# Patient Record
Sex: FEMALE | Race: WHITE | Employment: OTHER | URBAN - METROPOLITAN AREA
[De-identification: names, ages, dates, MRNs, and addresses within clinical notes are randomized per-mention and may not be internally consistent; named-entity substitution may affect disease eponyms.]

---

## 2017-11-08 ENCOUNTER — APPOINTMENT (OUTPATIENT)
Dept: GENERAL RADIOLOGY | Age: 77
DRG: 309 | End: 2017-11-08
Attending: EMERGENCY MEDICINE
Payer: MEDICARE

## 2017-11-08 ENCOUNTER — HOSPITAL ENCOUNTER (INPATIENT)
Age: 77
LOS: 3 days | Discharge: NURSING, REHAB OR LONG-TERM CARE | DRG: 309 | End: 2017-11-11
Attending: EMERGENCY MEDICINE | Admitting: INTERNAL MEDICINE
Payer: MEDICARE

## 2017-11-08 DIAGNOSIS — F03.90 DEMENTIA WITHOUT BEHAVIORAL DISTURBANCE, UNSPECIFIED DEMENTIA TYPE: ICD-10-CM

## 2017-11-08 DIAGNOSIS — I48.91 ATRIAL FIBRILLATION WITH RVR (HCC): Primary | ICD-10-CM

## 2017-11-08 PROBLEM — I10 HTN (HYPERTENSION): Status: ACTIVE | Noted: 2017-11-08

## 2017-11-08 PROBLEM — R62.7 FAILURE TO THRIVE IN ADULT: Status: ACTIVE | Noted: 2017-11-08

## 2017-11-08 LAB
ALBUMIN SERPL-MCNC: 3.8 G/DL (ref 3.4–5)
ALBUMIN/GLOB SERPL: 0.9 {RATIO} (ref 0.8–1.7)
ALP SERPL-CCNC: 141 U/L (ref 45–117)
ALT SERPL-CCNC: 26 U/L (ref 13–56)
ANION GAP SERPL CALC-SCNC: 12 MMOL/L (ref 3–18)
AST SERPL-CCNC: 24 U/L (ref 15–37)
BASOPHILS # BLD: 0.1 K/UL (ref 0–0.06)
BASOPHILS NFR BLD: 1 % (ref 0–2)
BILIRUB SERPL-MCNC: 0.4 MG/DL (ref 0.2–1)
BUN SERPL-MCNC: 12 MG/DL (ref 7–18)
BUN/CREAT SERPL: 11 (ref 12–20)
CALCIUM SERPL-MCNC: 9.5 MG/DL (ref 8.5–10.1)
CHLORIDE SERPL-SCNC: 107 MMOL/L (ref 100–108)
CK MB CFR SERPL CALC: 0.9 % (ref 0–4)
CK MB CFR SERPL CALC: 1 % (ref 0–4)
CK MB SERPL-MCNC: 1.5 NG/ML (ref 5–25)
CK MB SERPL-MCNC: 1.7 NG/ML (ref 5–25)
CK SERPL-CCNC: 158 U/L (ref 26–192)
CK SERPL-CCNC: 176 U/L (ref 26–192)
CO2 SERPL-SCNC: 26 MMOL/L (ref 21–32)
CREAT SERPL-MCNC: 1.13 MG/DL (ref 0.6–1.3)
DIFFERENTIAL METHOD BLD: ABNORMAL
EOSINOPHIL # BLD: 0.1 K/UL (ref 0–0.4)
EOSINOPHIL NFR BLD: 1 % (ref 0–5)
ERYTHROCYTE [DISTWIDTH] IN BLOOD BY AUTOMATED COUNT: 14.4 % (ref 11.6–14.5)
GLOBULIN SER CALC-MCNC: 4.1 G/DL (ref 2–4)
GLUCOSE SERPL-MCNC: 112 MG/DL (ref 74–99)
HCT VFR BLD AUTO: 39.4 % (ref 35–45)
HGB BLD-MCNC: 13.3 G/DL (ref 12–16)
INR PPP: 0.9 (ref 0.8–1.2)
LYMPHOCYTES # BLD: 1.6 K/UL (ref 0.9–3.6)
LYMPHOCYTES NFR BLD: 18 % (ref 21–52)
MCH RBC QN AUTO: 29.8 PG (ref 24–34)
MCHC RBC AUTO-ENTMCNC: 33.8 G/DL (ref 31–37)
MCV RBC AUTO: 88.1 FL (ref 74–97)
MONOCYTES # BLD: 0.7 K/UL (ref 0.05–1.2)
MONOCYTES NFR BLD: 8 % (ref 3–10)
NEUTS SEG # BLD: 6.4 K/UL (ref 1.8–8)
NEUTS SEG NFR BLD: 72 % (ref 40–73)
PLATELET # BLD AUTO: 251 K/UL (ref 135–420)
PMV BLD AUTO: 11.3 FL (ref 9.2–11.8)
POTASSIUM SERPL-SCNC: 3.4 MMOL/L (ref 3.5–5.5)
PROT SERPL-MCNC: 7.9 G/DL (ref 6.4–8.2)
PROTHROMBIN TIME: 11.6 SEC (ref 11.5–15.2)
RBC # BLD AUTO: 4.47 M/UL (ref 4.2–5.3)
SODIUM SERPL-SCNC: 145 MMOL/L (ref 136–145)
TROPONIN I SERPL-MCNC: 0.04 NG/ML (ref 0–0.06)
TROPONIN I SERPL-MCNC: 0.05 NG/ML (ref 0–0.06)
WBC # BLD AUTO: 8.9 K/UL (ref 4.6–13.2)

## 2017-11-08 PROCEDURE — 96365 THER/PROPH/DIAG IV INF INIT: CPT

## 2017-11-08 PROCEDURE — 82550 ASSAY OF CK (CPK): CPT | Performed by: EMERGENCY MEDICINE

## 2017-11-08 PROCEDURE — 65660000000 HC RM CCU STEPDOWN

## 2017-11-08 PROCEDURE — 93005 ELECTROCARDIOGRAM TRACING: CPT

## 2017-11-08 PROCEDURE — 85025 COMPLETE CBC W/AUTO DIFF WBC: CPT | Performed by: EMERGENCY MEDICINE

## 2017-11-08 PROCEDURE — 80053 COMPREHEN METABOLIC PANEL: CPT | Performed by: EMERGENCY MEDICINE

## 2017-11-08 PROCEDURE — 74011000250 HC RX REV CODE- 250: Performed by: EMERGENCY MEDICINE

## 2017-11-08 PROCEDURE — 85610 PROTHROMBIN TIME: CPT | Performed by: EMERGENCY MEDICINE

## 2017-11-08 PROCEDURE — 36415 COLL VENOUS BLD VENIPUNCTURE: CPT | Performed by: INTERNAL MEDICINE

## 2017-11-08 PROCEDURE — 74011250637 HC RX REV CODE- 250/637: Performed by: INTERNAL MEDICINE

## 2017-11-08 PROCEDURE — 99284 EMERGENCY DEPT VISIT MOD MDM: CPT

## 2017-11-08 PROCEDURE — 94760 N-INVAS EAR/PLS OXIMETRY 1: CPT

## 2017-11-08 PROCEDURE — 71010 XR CHEST PORT: CPT

## 2017-11-08 PROCEDURE — 74011250636 HC RX REV CODE- 250/636: Performed by: INTERNAL MEDICINE

## 2017-11-08 PROCEDURE — 74011000258 HC RX REV CODE- 258: Performed by: EMERGENCY MEDICINE

## 2017-11-08 RX ORDER — ENOXAPARIN SODIUM 100 MG/ML
60 INJECTION SUBCUTANEOUS ONCE
Status: COMPLETED | OUTPATIENT
Start: 2017-11-08 | End: 2017-11-08

## 2017-11-08 RX ORDER — POTASSIUM CHLORIDE 20 MEQ/1
40 TABLET, EXTENDED RELEASE ORAL 2 TIMES DAILY
Status: COMPLETED | OUTPATIENT
Start: 2017-11-08 | End: 2017-11-09

## 2017-11-08 RX ORDER — PANTOPRAZOLE SODIUM 40 MG/1
40 TABLET, DELAYED RELEASE ORAL DAILY
COMMUNITY

## 2017-11-08 RX ORDER — CITALOPRAM 10 MG/1
10 TABLET ORAL DAILY
Status: DISCONTINUED | OUTPATIENT
Start: 2017-11-09 | End: 2017-11-11

## 2017-11-08 RX ORDER — AMLODIPINE BESYLATE 5 MG/1
5 TABLET ORAL DAILY
Status: DISCONTINUED | OUTPATIENT
Start: 2017-11-09 | End: 2017-11-11 | Stop reason: HOSPADM

## 2017-11-08 RX ORDER — AMLODIPINE BESYLATE 5 MG/1
5 TABLET ORAL DAILY
COMMUNITY

## 2017-11-08 RX ORDER — HYDROCHLOROTHIAZIDE 12.5 MG/1
12.5 TABLET ORAL DAILY
COMMUNITY

## 2017-11-08 RX ORDER — DILTIAZEM HYDROCHLORIDE 5 MG/ML
15 INJECTION INTRAVENOUS
Status: COMPLETED | OUTPATIENT
Start: 2017-11-08 | End: 2017-11-08

## 2017-11-08 RX ORDER — ACETAMINOPHEN 325 MG/1
650 TABLET ORAL
Status: DISCONTINUED | OUTPATIENT
Start: 2017-11-08 | End: 2017-11-11 | Stop reason: HOSPADM

## 2017-11-08 RX ORDER — CITALOPRAM 10 MG/1
10 TABLET ORAL DAILY
COMMUNITY
End: 2017-11-11

## 2017-11-08 RX ORDER — PANTOPRAZOLE SODIUM 40 MG/1
40 TABLET, DELAYED RELEASE ORAL
Status: DISCONTINUED | OUTPATIENT
Start: 2017-11-08 | End: 2017-11-11 | Stop reason: HOSPADM

## 2017-11-08 RX ORDER — POTASSIUM CHLORIDE 750 MG/1
10 TABLET, FILM COATED, EXTENDED RELEASE ORAL DAILY
COMMUNITY

## 2017-11-08 RX ORDER — HYDROCHLOROTHIAZIDE 25 MG/1
12.5 TABLET ORAL DAILY
Status: DISCONTINUED | OUTPATIENT
Start: 2017-11-09 | End: 2017-11-11 | Stop reason: HOSPADM

## 2017-11-08 RX ADMIN — POTASSIUM CHLORIDE 40 MEQ: 20 TABLET, EXTENDED RELEASE ORAL at 21:14

## 2017-11-08 RX ADMIN — SODIUM CHLORIDE 2.5 MG/HR: 900 INJECTION, SOLUTION INTRAVENOUS at 16:50

## 2017-11-08 RX ADMIN — PANTOPRAZOLE SODIUM 40 MG: 40 TABLET, DELAYED RELEASE ORAL at 18:16

## 2017-11-08 RX ADMIN — DILTIAZEM HYDROCHLORIDE 15 MG: 5 INJECTION INTRAVENOUS at 16:49

## 2017-11-08 RX ADMIN — ENOXAPARIN SODIUM 60 MG: 60 INJECTION SUBCUTANEOUS at 18:30

## 2017-11-08 NOTE — ED PROVIDER NOTES
[address]  EMERGENCY DEPARTMENT HISTORY AND PHYSICAL EXAM       Date: 11/8/2017   Patient Name: Moises Beth   YOB: 1940  Medical Record Number: [id number]    History of Presenting Illness     Chief Complaint   Patient presents with    Johnie Angel     Saw Dr. Chary Harrison earlier today where he discoverd she had a-fib and was told to go to the ER        History Provided By:  Patient, daughter    Additional History: 4:20 PM  Moises Beth is a 68 y.o. female PMHx of dementia presents to the emergency department C/O palpitations onset this afternoon at physician's office. No associated symptoms. She was taken by her daughters to establish care with a primary care doctor. She has severe dementia and lives alone, her daughters are unsure as to whether or not she has been taking her medications. Patient is followed by Dr. Claudio Herring, Neurologist, for her dementia. Pt denies CP, SOB and any other Sx or complaints. HPI is limited to patient condition. Primary Care Provider: Paxton Del Real MD   Specialist:    Past History     Past Medical History:   Past Medical History:   Diagnosis Date    Menopause         Past Surgical History:   Past Surgical History:   Procedure Laterality Date    HX HYSTERECTOMY      Age 32        Family History:   Family History   Problem Relation Age of Onset    Breast Cancer Sister         Social History:   Social History   Substance Use Topics    Smoking status: Not on file    Smokeless tobacco: Not on file    Alcohol use Not on file        Allergies:   Not on File     Review of Systems   Review of Systems   Unable to perform ROS: Dementia   Respiratory: Negative for shortness of breath. Cardiovascular: Positive for palpitations. Negative for chest pain.        Physical Exam  Vitals:    11/08/17 1607   BP: (!) 163/98   Pulse: (!) 160   Resp: 23   Temp: 98 °F (36.7 °C)   SpO2: 97%   Weight: 59 kg (130 lb)   Height: 5' 6\" (1.676 m)       Physical Exam  Constitutional: Alert. Well appearing, no acute distress  Head: Normocephalic, Atraumatic  Eyes: Pupils are equal, round, and reactive to light, EOMI  ENT: Moist mucous membranes, oropharynx clear. Neck: Supple, non-tender  Cardiovascular: Tachycardia, Irregularly irregular rhythm. no murmurs, rubs, or gallops  Chest: Normal work of breathing and chest excursion bilaterally  Lungs: Clear to ausculation bilaterally  Abdomen: Soft, non tender, non distended, normoactive bowel sounds  Back: No evidence of trauma or deformity. No CVA Tenderness. Extremities: No evidence of trauma or deformity, no LE edema  Skin: Warm and dry  Neuro:  facial movement symmetric, normal speech, strength and sensation full and symmetric bilaterally, normal gait, normal coordination. Alert and oriented to person and place, not time or situation. Unable to answer many questions.    Psychiatric: Normal mood and affect    Diagnostic Study Results     Labs -      Recent Results (from the past 12 hour(s))   EKG, 12 LEAD, INITIAL    Collection Time: 11/08/17  3:53 PM   Result Value Ref Range    Ventricular Rate 142 BPM    Atrial Rate 142 BPM    QRS Duration 72 ms    Q-T Interval 286 ms    QTC Calculation (Bezet) 439 ms    Calculated R Axis -24 degrees    Calculated T Axis 77 degrees    Diagnosis       Undetermined rhythm  Marked ST abnormality, possible lateral subendocardial injury  Abnormal ECG  No previous ECGs available     CBC WITH AUTOMATED DIFF    Collection Time: 11/08/17  4:40 PM   Result Value Ref Range    WBC 8.9 4.6 - 13.2 K/uL    RBC 4.47 4.20 - 5.30 M/uL    HGB 13.3 12.0 - 16.0 g/dL    HCT 39.4 35.0 - 45.0 %    MCV 88.1 74.0 - 97.0 FL    MCH 29.8 24.0 - 34.0 PG    MCHC 33.8 31.0 - 37.0 g/dL    RDW 14.4 11.6 - 14.5 %    PLATELET 697 362 - 644 K/uL    MPV 11.3 9.2 - 11.8 FL    NEUTROPHILS 72 40 - 73 %    LYMPHOCYTES 18 (L) 21 - 52 %    MONOCYTES 8 3 - 10 %    EOSINOPHILS 1 0 - 5 %    BASOPHILS 1 0 - 2 % ABS. NEUTROPHILS 6.4 1.8 - 8.0 K/UL    ABS. LYMPHOCYTES 1.6 0.9 - 3.6 K/UL    ABS. MONOCYTES 0.7 0.05 - 1.2 K/UL    ABS. EOSINOPHILS 0.1 0.0 - 0.4 K/UL    ABS. BASOPHILS 0.1 (H) 0.0 - 0.06 K/UL    DF AUTOMATED     CARDIAC PANEL,(CK, CKMB & TROPONIN)    Collection Time: 11/08/17  4:40 PM   Result Value Ref Range     26 - 192 U/L    CK - MB 1.7 <3.6 ng/ml    CK-MB Index 1.0 0.0 - 4.0 %    Troponin-I, Qt. 0.05 0.00 - 0.90 NG/ML   METABOLIC PANEL, COMPREHENSIVE    Collection Time: 11/08/17  4:40 PM   Result Value Ref Range    Sodium 145 136 - 145 mmol/L    Potassium 3.4 (L) 3.5 - 5.5 mmol/L    Chloride 107 100 - 108 mmol/L    CO2 26 21 - 32 mmol/L    Anion gap 12 3.0 - 18 mmol/L    Glucose 112 (H) 74 - 99 mg/dL    BUN 12 7.0 - 18 MG/DL    Creatinine 1.13 0.6 - 1.3 MG/DL    BUN/Creatinine ratio 11 (L) 12 - 20      GFR est AA 57 (L) >60 ml/min/1.73m2    GFR est non-AA 47 (L) >60 ml/min/1.73m2    Calcium 9.5 8.5 - 10.1 MG/DL    Bilirubin, total 0.4 0.2 - 1.0 MG/DL    ALT (SGPT) 26 13 - 56 U/L    AST (SGOT) 24 15 - 37 U/L    Alk. phosphatase 141 (H) 45 - 117 U/L    Protein, total 7.9 6.4 - 8.2 g/dL    Albumin 3.8 3.4 - 5.0 g/dL    Globulin 4.1 (H) 2.0 - 4.0 g/dL    A-G Ratio 0.9 0.8 - 1.7     PROTHROMBIN TIME + INR    Collection Time: 11/08/17  4:40 PM   Result Value Ref Range    Prothrombin time 11.6 11.5 - 15.2 sec    INR 0.9 0.8 - 1.2         Radiologic Studies -  The following have been ordered and reviewed:  XR CHEST PORT   Final Result  Impression:  No acute radiographic cardiopulmonary abnormality. Stable exam.    As read by the radiologist.          Medical Decision Making   I am the first provider for this patient. I reviewed the vital signs, available nursing notes, past medical history, past surgical history, family history and social history. Vital Signs-Reviewed the patient's vital signs.    Patient Vitals for the past 12 hrs:   Temp Pulse Resp BP SpO2   11/08/17 1607 98 °F (36.7 °C) (!) 160 23 (!) 163/98 97 %       Pulse Oximetry Analysis - Normal 97% on RA     Cardiac Monitor:   Rate: 142 bpm  Rhythm: Atrial Fibrillation      EKG interpretation: (Preliminary)  Rhythm:Atrial Fibrillation . Rate (approx.): 142 bpm; T wave inversion in avL. No ST segment change. Normal QTC  interval    EKG read by Inga Ortiz MD at 3:58 PM    Old Medical Records: Old medical records. Nursing notes. Procedures:   Procedures    ED Course:  4:20 PM  Initial assessment performed. The patients presenting problems have been discussed, and they are in agreement with the care plan formulated and outlined with them. I have encouraged them to ask questions as they arise throughout their visit. 6:05 Discussed patient's history, exam, and available diagnostics results with Rigoberto Baker MD, hospitalist, who agree with telemetry. Medications Given in the ED:  Medications   dilTIAZem (CARDIZEM) 100 mg in 0.9% sodium chloride (MBP/ADV) 100 mL infusion (2.5 mg/hr IntraVENous New Bag 11/8/17 1650)   acetaminophen (TYLENOL) tablet 650 mg (not administered)   potassium chloride (K-DUR, KLOR-CON) SR tablet 40 mEq (not administered)   citalopram (CELEXA) tablet 10 mg (not administered)   hydroCHLOROthiazide (HYDRODIURIL) tablet 12.5 mg (not administered)   pantoprazole (PROTONIX) tablet 40 mg (40 mg Oral Given 11/8/17 1816)   amLODIPine (NORVASC) tablet 5 mg (not administered)   dilTIAZem (CARDIZEM) injection 15 mg (15 mg IntraVENous Given 11/8/17 1649)   enoxaparin (LOVENOX) injection 60 mg (60 mg SubCUTAneous Given 11/8/17 1830)       6:05 PM  Patient is being admitted to the hospital by Rigoberto Baker MD. The results of their tests and reasons for their admission have been discussed with them and/or available family. They convey agreement and understanding for the need to be admitted and for their admission diagnosis. CONDITIONS ON ADMISSION:  Deep Vein Thrombosis is not present at the time of admission.  Thrombosis is not present at the time of admission. Urinary Tract Infection is not present at the time of admission. Pneumonia is not present at the time of admission. MRSA is not present at the time of admission. Wound infection is not present at the time of admission. Pressure Ulcer is not present at the time of admission. Critical Care Time:   6:05 PM  I have spent 35  minutes of critical care time involved in lab review, consultations with specialist, family decision-making, and documentation. During this entire length of time I was immediately available to the patient. Critical Care: The reason for providing this level of medical care for this critically ill patient was due a critical illness that impaired one or more vital organ systems such that there was a high probability of imminent or life threatening deterioration in the patients condition. This care involved high complexity decision making to assess, manipulate, and support vital system functions, to treat this degreee vital organ system failure and to prevent further life threatening deterioration of the patients condition. Diagnosis   Clinical Impression:   1. Atrial fibrillation with RVR ([address]    2. Dementia without behavioral disturbance, unspecified dementia type         Discussion:  68year old female present for new onset Afib with RVR. Controlled with IV Diltiazem. Will require admission for continued cardiac management. Of note she has severe dementia and will likely need placement in a rehab or nursing facility.    _______________________________   Attestations: This note is prepared by Angela Fermin, acting as a Scribe for Tano Sanchez MD on 3:56 PM on 11/8/2017. Tano Sanchez MD: The scribe's documentation has been prepared under my direction and personally reviewed by me in its entirety.   _______________________________

## 2017-11-09 LAB
ANION GAP SERPL CALC-SCNC: 9 MMOL/L (ref 3–18)
BUN SERPL-MCNC: 12 MG/DL (ref 7–18)
BUN/CREAT SERPL: 10 (ref 12–20)
CALCIUM SERPL-MCNC: 8.4 MG/DL (ref 8.5–10.1)
CHLORIDE SERPL-SCNC: 109 MMOL/L (ref 100–108)
CHOLEST SERPL-MCNC: 118 MG/DL
CK MB CFR SERPL CALC: 1 % (ref 0–4)
CK MB SERPL-MCNC: 1.4 NG/ML (ref 5–25)
CK SERPL-CCNC: 141 U/L (ref 26–192)
CO2 SERPL-SCNC: 27 MMOL/L (ref 21–32)
CREAT SERPL-MCNC: 1.25 MG/DL (ref 0.6–1.3)
GLUCOSE SERPL-MCNC: 98 MG/DL (ref 74–99)
HDLC SERPL-MCNC: 53 MG/DL (ref 40–60)
HDLC SERPL: 2.2 {RATIO} (ref 0–5)
LDLC SERPL CALC-MCNC: 47 MG/DL (ref 0–100)
LIPID PROFILE,FLP: NORMAL
MAGNESIUM SERPL-MCNC: 2.4 MG/DL (ref 1.6–2.6)
POTASSIUM SERPL-SCNC: 3.9 MMOL/L (ref 3.5–5.5)
SODIUM SERPL-SCNC: 145 MMOL/L (ref 136–145)
TRIGL SERPL-MCNC: 90 MG/DL (ref ?–150)
TROPONIN I SERPL-MCNC: 0.04 NG/ML (ref 0–0.06)
TSH SERPL DL<=0.05 MIU/L-ACNC: 2.16 UIU/ML (ref 0.36–3.74)
VLDLC SERPL CALC-MCNC: 18 MG/DL

## 2017-11-09 PROCEDURE — 82550 ASSAY OF CK (CPK): CPT | Performed by: INTERNAL MEDICINE

## 2017-11-09 PROCEDURE — 74011250637 HC RX REV CODE- 250/637: Performed by: INTERNAL MEDICINE

## 2017-11-09 PROCEDURE — 65660000000 HC RM CCU STEPDOWN

## 2017-11-09 PROCEDURE — 83735 ASSAY OF MAGNESIUM: CPT | Performed by: INTERNAL MEDICINE

## 2017-11-09 PROCEDURE — 84443 ASSAY THYROID STIM HORMONE: CPT | Performed by: INTERNAL MEDICINE

## 2017-11-09 PROCEDURE — 74011250637 HC RX REV CODE- 250/637: Performed by: PHYSICIAN ASSISTANT

## 2017-11-09 PROCEDURE — 80061 LIPID PANEL: CPT | Performed by: INTERNAL MEDICINE

## 2017-11-09 PROCEDURE — 80048 BASIC METABOLIC PNL TOTAL CA: CPT | Performed by: INTERNAL MEDICINE

## 2017-11-09 PROCEDURE — 36415 COLL VENOUS BLD VENIPUNCTURE: CPT | Performed by: INTERNAL MEDICINE

## 2017-11-09 PROCEDURE — 97161 PT EVAL LOW COMPLEX 20 MIN: CPT

## 2017-11-09 PROCEDURE — 93306 TTE W/DOPPLER COMPLETE: CPT

## 2017-11-09 RX ORDER — SERTRALINE HYDROCHLORIDE 50 MG/1
25 TABLET, FILM COATED ORAL DAILY
Status: DISCONTINUED | OUTPATIENT
Start: 2017-11-09 | End: 2017-11-11

## 2017-11-09 RX ORDER — DILTIAZEM HYDROCHLORIDE 120 MG/1
120 CAPSULE, COATED, EXTENDED RELEASE ORAL DAILY
Status: DISCONTINUED | OUTPATIENT
Start: 2017-11-09 | End: 2017-11-11 | Stop reason: HOSPADM

## 2017-11-09 RX ORDER — OLANZAPINE 2.5 MG/1
2.5 TABLET ORAL
Status: DISCONTINUED | OUTPATIENT
Start: 2017-11-09 | End: 2017-11-11 | Stop reason: HOSPADM

## 2017-11-09 RX ORDER — ENOXAPARIN SODIUM 100 MG/ML
1 INJECTION SUBCUTANEOUS EVERY 12 HOURS
Status: DISCONTINUED | OUTPATIENT
Start: 2017-11-09 | End: 2017-11-09

## 2017-11-09 RX ORDER — OLANZAPINE 10 MG/1
5 TABLET ORAL EVERY EVENING
Status: DISCONTINUED | OUTPATIENT
Start: 2017-11-09 | End: 2017-11-11 | Stop reason: HOSPADM

## 2017-11-09 RX ORDER — DILTIAZEM HYDROCHLORIDE 120 MG/1
120 CAPSULE, COATED, EXTENDED RELEASE ORAL DAILY
Status: DISCONTINUED | OUTPATIENT
Start: 2017-11-10 | End: 2017-11-09

## 2017-11-09 RX ADMIN — POTASSIUM CHLORIDE 40 MEQ: 20 TABLET, EXTENDED RELEASE ORAL at 09:28

## 2017-11-09 RX ADMIN — OLANZAPINE 2.5 MG: 2.5 TABLET, FILM COATED ORAL at 09:27

## 2017-11-09 RX ADMIN — OLANZAPINE 5 MG: 10 TABLET, FILM COATED ORAL at 20:36

## 2017-11-09 RX ADMIN — PANTOPRAZOLE SODIUM 40 MG: 40 TABLET, DELAYED RELEASE ORAL at 17:44

## 2017-11-09 RX ADMIN — HYDROCHLOROTHIAZIDE 12.5 MG: 25 TABLET ORAL at 09:28

## 2017-11-09 RX ADMIN — SERTRALINE HYDROCHLORIDE 25 MG: 50 TABLET ORAL at 12:22

## 2017-11-09 RX ADMIN — AMLODIPINE BESYLATE 5 MG: 5 TABLET ORAL at 09:27

## 2017-11-09 RX ADMIN — DILTIAZEM HYDROCHLORIDE 120 MG: 120 CAPSULE, COATED, EXTENDED RELEASE ORAL at 12:22

## 2017-11-09 RX ADMIN — PANTOPRAZOLE SODIUM 40 MG: 40 TABLET, DELAYED RELEASE ORAL at 09:36

## 2017-11-09 NOTE — PROGRESS NOTES
Hospitalist Progress Note    Patient: Roxane Horton MRN: [id number]  CSN: [id number]    YOB: 1940  Age: 68 y.o. Sex: female    DOA: 11/8/2017 LOS:  LOS: 1 day          Chief Complaint:    Palpitations    Assessment/Plan     1. Afib with RVR  2. Failure to Thrive  3. Dementia, Moderate to Advanced  4. Hypertension  5. Hypokalemia  6. GERD    1. Patient converted to NSR while on Cardizem gtt. At this time, will discontinue gtt and start oral Cardizem at 120mg daily. She received one therapeutic dose of Lovenox while in the ER. At this time, will add aspirin 81mg also as the patient is in NSR.   2. PT/OT to see, evaluate, treat, and make recommendations. 4. Continue Norvasc, HCTZ for now, BP is stable. 5. K 3.9 this AM, will continue to monitor. 6. Protonix BID. Patient Active Problem List   Diagnosis Code    Atrial fibrillation with RVR (HCC) I48.91    HTN (hypertension) I10    Dementia F03.90    Failure to thrive in adult R62.7       Subjective:    Patient says she's doing well, no complaints or concerns. Daughter sitting at bedside.     Review of systems:    Constitutional: denies fevers, chills, myalgias  Respiratory: denies SOB, cough  Cardiovascular: denies chest pain, palpitations  Gastrointestinal: denies nausea, vomiting, diarrhea      Vital signs/Intake and Output:  Visit Vitals    /44 (BP 1 Location: Right arm, BP Patient Position: At rest)    Pulse 79    Temp 98.1 °F (36.7 °C)    Resp 16    Ht 5' 6\" (1.676 m)    Wt 67.1 kg (148 lb)    SpO2 97%    Breastfeeding No    BMI 23.89 kg/m2     Current Shift:  11/09 0701 - 11/09 1900  In: 397.2 [P.O.:360; I.V.:37.2]  Out: -   Last three shifts:  11/07 1901 - 11/09 0700  In: 240 [P.O.:240]  Out: -     Exam:    General: Well developed, alert, NAD  Head/Neck: NCAT, supple, No masses, No lymphadenopathy  CVS:Regular rate and rhythm, no M/R/G, S1/S2 heard, no thrill  Lungs:Clear to auscultation bilaterally, no wheezes, rhonchi, or rales  Abdomen: Soft, Nontender, No distention, Normal Bowel sounds, No hepatomegaly  Extremities: No C/C/E, pulses palpable 2+  Skin:normal texture and turgor, no rashes, no lesions  Neuro:grossly normal , follows commands  Psych:appropriate                Labs: Results:       Chemistry Recent Labs      11/09/17   0430  11/08/17   1640   GLU  98  112*   NA  145  145   K  3.9  3.4*   CL  109*  107   CO2  27  26   BUN  12  12   CREA  1.25  1.13   CA  8.4*  9.5   AGAP  9  12   BUCR  10*  11*   AP   --   141*   TP   --   7.9   ALB   --   3.8   GLOB   --   4.1*   AGRAT   --   0.9      CBC w/Diff Recent Labs      11/08/17   1640   WBC  8.9   RBC  4.47   HGB  13.3   HCT  39.4   PLT  251   GRANS  72   LYMPH  18*   EOS  1      Cardiac Enzymes Recent Labs      11/09/17   0430  11/08/17   2215   CPK  141  158   CKND1  1.0  0.9      Coagulation Recent Labs      11/08/17   1640   PTP  11.6   INR  0.9       Lipid Panel Lab Results   Component Value Date/Time    Cholesterol, total 118 11/09/2017 04:30 AM    HDL Cholesterol 53 11/09/2017 04:30 AM    LDL, calculated 47 11/09/2017 04:30 AM    VLDL, calculated 18 11/09/2017 04:30 AM    Triglyceride 90 11/09/2017 04:30 AM    CHOL/HDL Ratio 2.2 11/09/2017 04:30 AM      BNP No results for input(s): BNPP in the last 72 hours.    Liver Enzymes Recent Labs      11/08/17   1640   TP  7.9   ALB  3.8   AP  141*   SGOT  24      Thyroid Studies No results found for: T4, T3U, TSH, TSHEXT     Procedures/imaging: see electronic medical records for all procedures/Xrays and details which were not copied into this note but were reviewed prior to creation of [address], PA-C

## 2017-11-09 NOTE — ROUTINE PROCESS
TRANSFER - IN REPORT:    Verbal report received from LETI Benavides RN(name) on Tracy Aragon  being received from ED(unit) for routine progression of care      Report consisted of patients Situation, Background, Assessment and   Recommendations(SBAR). Information from the following report(s) SBAR, Kardex, ED Summary, Intake/Output, MAR and Recent Results was reviewed with the receiving nurse. Opportunity for questions and clarification was provided.

## 2017-11-09 NOTE — PROGRESS NOTES
1956:  Patient arrived to unit form ED via stretcher accompanied by ED medic and RN. Patient able to ambulate. From olson to bed without difficulty. IV Cardizem 2.5 mg/hr verified with ED RN at bedside. 2000:  Admission assessment completed. Patient is A&O x2, to person and place, but seems confused to situation and cannot give correct date. Per chart and family at bedside, patient with mild dementia. Patient repeats same questions multiple times, requires frequent reminders to stay on task. Lungs are CTA bilaterally on room air. No edema present. Telemetry box #1 shows Afib with a rate in the upper 90s to low 110s. BS + all 4 quads, patient stated she is hungry. Will given box lunch per diet orders. Patient denies pain and no needs are stated. 2114:  Scheduled medications given as ordered. POC for remainder of shift discussed with patient and family members and all questions answered. No further needs at this time. 2219:  Rounds completed. Patient sitting up in bed, doing word puzzles and watching TV. No needs stated. 2334:  Reassessment completed. Informed by Monitor Tech. That patient converted to NSR. Cardizem gtt. Continues at 2.5 mg/hr. HR currently 87. No other changes to previous assessment are noted. Patient denies pain and no needs are stated. 0100:  Rounds completed. No needs stated. Patient resting in bed with eyes closed. Remains in NSR, HR 80s.      0202:  Rounds completed. No needs noted. 1119:  Reassessment completed. No changes from previous assessment noted. Patient continues to deny pain and no needs are stated. 0505:  Rounds completed. No needs stated.

## 2017-11-09 NOTE — ROUTINE PROCESS
1500:  Assumed care of patient. Patient quietly resting in bed with family at bedside, patient denies an sob or pain. White board updated, call light left within reach.

## 2017-11-09 NOTE — PROGRESS NOTES
Problem: Falls - Risk of  Goal: *Absence of Falls  Document Mark Fall Risk and appropriate interventions in the flowsheet.    Outcome: Progressing Towards Goal  Fall Risk Interventions:  Mobility Interventions: Assess mobility with egress test, Patient to call before getting OOB    Mentation Interventions: Adequate sleep, hydration, pain control    Medication Interventions: Patient to call before getting OOB, Teach patient to arise slowly    Elimination Interventions: Call light in reach, Patient to call for help with toileting needs    History of Falls Interventions: Bed/chair exit alarm

## 2017-11-09 NOTE — PROGRESS NOTES
Problem: Falls - Risk of  Goal: *Absence of Falls  Document Mark Fall Risk and appropriate interventions in the flowsheet.    Outcome: Progressing Towards Goal  Fall Risk Interventions:  Mobility Interventions: Bed/chair exit alarm, Communicate number of staff needed for ambulation/transfer, Patient to call before getting OOB, Strengthening exercises (ROM-active/passive)    Mentation Interventions: Adequate sleep, hydration, pain control, Bed/chair exit alarm, Door open when patient unattended, Evaluate medications/consider consulting pharmacy, Eyeglasses and hearing aids, Family/sitter at bedside, Increase mobility, More frequent rounding, Reorient patient, Room close to nurse's station, Toileting rounds, Update white board    Medication Interventions: Bed/chair exit alarm, Evaluate medications/consider consulting pharmacy, Patient to call before getting OOB, Teach patient to arise slowly    Elimination Interventions: Bed/chair exit alarm, Call light in reach, Patient to call for help with toileting needs, Toileting schedule/hourly rounds    History of Falls Interventions: Bed/chair exit alarm, Door open when patient unattended, Evaluate medications/consider consulting pharmacy, Room close to nurse's station

## 2017-11-09 NOTE — PROGRESS NOTES

## 2017-11-09 NOTE — H&P
[address] [place] [address]  ROUTINE HISTORY AND PHYSICAL    Name:  Rosalinda Ramesh  MR#:  [id number]  :  1940  Account #:  [de-identified]  Date of Adm:  2017      PRIMARY CARE PROVIDER; Ronnie Steel MD    CHIEF COMPLAINT: Palpitations. HISTORY OF PRESENT ILLNESS: The patient is a 44-year-old  female with moderate to advanced dementia, she lives in her own  home. She has not followed regularly with primary care. She ordinarily  feels that there is nothing wrong with her and there is no reason to see  a primary care physician. She does see Dr. Sindi Coronado of  neurology at [facility] for her dementia. She was brought over by her  daughter for the palpitations. In the ER, she was evaluated. She had  an EKG and was found to have atrial fibrillation with RVR with a  ventricular rate of 142. Her cardiac enzymes were negative x1 set. Her  potassium was a tiny bit decreased at 3.4. CBC was okay. At home  she is on medication for high blood pressure, including amlodipine and  hydrochlorothiazide. She takes some potassium supplements. Given  this new diagnosis of atrial fibrillation with RVR, I have been asked to  admit her for further evaluation and treatment. At the present time, she  denies headache, focal neurological signs such as weakness or  paresthesias. She denies chest pain or palps. She denies shortness of  breath. She has no abdominal symptoms. She denies dysuria. PAST MEDICAL HISTORY: Hypertension, GERD, hypokalemia,  dementia. SOCIAL HISTORY: No alcohol, drugs, or tobacco.    FAMILY HISTORY: Hypertension. OUTPATIENT MEDICATIONS  1. Potassium. 2. Amlodipine. 3. Hydrochlorothiazide. 4. Protonix. 5. Celexa. MEDICATION ALLERGIES: NKDA. REVIEW OF SYSTEMS  GENERAL: No fever or chills. HEENT: No headache, no blurred vision. CARDIOVASCULAR: Positive for palpitations. No chest pain. RESPIRATORY: No cough or shortness of breath.   GASTROINTESTINAL: No nausea or vomiting. No blood in stool, no  melena. GENITOURINARY: No dysuria. MUSCULOSKELETAL: No myalgias or arthralgias. NEUROLOGIC: Denies focal neurological deficits. History positive for  dementia. PHYSICAL EXAMINATION  VITAL SIGNS: Blood pressure 163/98. Pulse presently 110, though the  computer lists 160. O2 saturation 97%, respiratory rate 23. GENERAL: No apparent distress, well-appearing. She appears to be  woman of few words. Not very talkative. HEENT: PERRLA. EOMI. Moist mucous membranes. NECK: Midline trachea. No bruits. HEART: S1, S2 irregularly irregular. Tachycardic. No murmur. LUNGS: Clear to auscultation bilaterally. ABDOMEN: Soft, nontender, nondistended. EXTREMITIES: No edema or cyanosis. LABORATORY DATA: White blood cell count 8.9, hemoglobin 13.3,  platelets 132. Sodium 145, potassium 3.4, BUN 12, creatinine 1. 13. Chest x-ray, no acute cardiothoracic abnormality. EKG as described  above. ASSESSMENT AND PLAN  1. Atrial fibrillation with rapid ventricular response. 2. Failure to thrive. 3. Dementia, moderate to advanced. 4. Hypertension. 5. Hypokalemia. 6. Gastroesophageal reflux. DISCUSSION AND PLAN  1. At this time, the patient will be brought into the hospital for further  evaluation and treatment. She has new fibrillation with RVR. It seems  like the ER is doing a good job getting her rate controlled. We will  continue her on the diltiazem drip and watch her overnight. I will give  her a shot of Lovenox to cover her and hopefully decrease her risk of  thromboembolism due to the atrial fibrillation. Ultimately, we will have  to figure out whether or not she is an anticoagulation candidate. Her  CHADS score would put her in range for anticoagulation if it was  deemed to be a safe option. Alternatively aspirin could be used. I will  go ahead and get an echocardiogram and thyroid studies. I will check  another set of cardiac enzymes. Will work on repleting her potassium.   2. PT, OT evaluation. I do not think the patient is thriving in her current  environment. I suspect she might need rehab or long-term care down  the road.         Rosa Maria White M.D.    Carlos Ayala MP  D:  11/08/2017   18:12  T:  11/08/2017   20:44  Job #:  [id number]    Dr. Su Burdick, neurology

## 2017-11-09 NOTE — PROGRESS NOTES
Problem: Mobility Impaired (Adult and Pediatric)  Goal: *Acute Goals and Plan of Care (Insert Text)  Physical Therapy Goals LT/ST  Initiated 11/9/2017 and to be accomplished within 3-5 day(s)  1. Patient will move from supine <> sit with S in prep for out of bed activity and change of position. 2.  Patient will perform sit<> stand with S with LRAD in prep for transfers/ambulation. 3.  Patient will transfer from bed <> chair with S with LRAD for time up in chair for completion of ADL activity. 4.  Patient will ambulate 150 feet with LRAD/S for improved functional mobility/safe discharge. 5.  Patient will ascend/descend >3 stairs withcontact guard assist/supervision for home re-entry as needed. Outcome: Progressing Towards Goal  physical Therapy EVALUATION    Patient: Atiya Goodman (20 y.o. female)  Date: 11/9/2017  Primary Diagnosis: Atrial fibrillation with rapid ventricular response (HCC)  A-fib ([address]        Precautions:  Fall    ASSESSMENT :  Based on the objective data described below, the patient presents with decrease independence in transfers and gait that affects pt's ability to perform ADLs safely. Pt seen in bed w/ telemonitor, bed alarm donned, and family present in the room. Pt appears confused during treatment session and distracts easily during session. Pt and family member reports that PTA, pt was independent and did not require an AD to perform ADLs. Pt and family however reports that she has had several falls in the last six months and reports the falls were due to LOB but no dizziness or lightheadedness at this time. Pt initially ambulated w/o an AD however pt demonstrated path deviations, scissoring gait pattern, and LOB which required HHA to ensure pt's stability and prevent pt from falling. However pt is unaware of occurrence and reports that she is only having path deviations secondary to not having her tennis shoes on.  Pt was then given a RW and pt was able to demonstrate better stability while ambulating but still demonstrated path deviations w/ AD. Pt was transferred to the EOB after session, call bell and tray in reach, bed alarm donned, family member present in the room, nurse notified after session. Patient will benefit from skilled intervention to address the above impairments. Patients rehabilitation potential is considered to be Good  Factors which may influence rehabilitation potential include:   []         None noted  [x]         Mental ability/status  []         Medical condition  [x]         Home/family situation and support systems  [x]         Safety awareness  []         Pain tolerance/management  []         Other:      PLAN :  Recommendations and Planned Interventions:  [x]           Bed Mobility Training             []    Neuromuscular Re-Education  [x]           Transfer Training                   []    Orthotic/Prosthetic Training  [x]           Gait Training                          []    Modalities  [x]           Therapeutic Exercises          []    Edema Management/Control  [x]           Therapeutic Activities            [x]    Patient and Family Training/Education  []           Other (comment):    Frequency/Duration: Patient will be followed by physical therapy once/twice daily to address goals. Discharge Recommendations: Rehab vs Home Health w/ 24 hr supervision (secondary to pt's confusion and frequent falls) pending pt's progress     Further Equipment Recommendations for Discharge: rolling walker     SUBJECTIVE:   Patient stated I would walk better if I had my tennis shoes off, I never walk without my shoes.     OBJECTIVE DATA SUMMARY:     Past Medical History:   Diagnosis Date    Menopause      Past Surgical History:   Procedure Laterality Date    HX HYSTERECTOMY      Age 32     Barriers to Learning/Limitations: yes;  cognitive and physical  Compensate with: Visual Cues, Verbal Cues and Tactile Cues  Prior Level of Function/Home Situation:   Home Situation  Home Environment: Private residence  # Steps to Enter: 3  Rails to Enter: No  One/Two Story Residence: One story  Living Alone: Yes  Support Systems: Child(rissa), Family member(s), Friends \ neighbors  Patient Expects to be Discharged to[de-identified] Rehabilitation facility  Current DME Used/Available at Home: Shower chair  Critical Behavior:  Neurologic State: Alert;Confused  Orientation Level: Disoriented to situation;Oriented to person;Oriented to place;Oriented to time  Cognition: Decreased attention/concentration;Decreased command following;Poor safety awareness  Safety/Judgement: Awareness of environment; Fall prevention  Psychosocial  Patient Behaviors: Calm;Confused; Cooperative  Purposeful Interaction: Yes  Pt Identified Daily Priority: Clinical issues (comment)  Caritas Process: Nurture loving kindness;Establish trust;Teaching/learning  Caring Interventions: Reassure  Reassure: Informing  Strength:    Strength: Within functional limits  Tone & Sensation:   Tone: Normal  Sensation: Intact  Range Of Motion:  AROM: Within functional limits  PROM: Within functional limits  Functional Mobility:  Bed Mobility:  Supine to Sit: Supervision;Contact guard assistance  Sit to Supine: Supervision;Contact guard assistance  Transfers:  Sit to Stand: Contact guard assistance  Stand to Sit: Contact guard assistance  Balance:   Sitting: Intact  Standing: Intact; With support  Ambulation/Gait Training:  Distance (ft): 300 Feet (ft)  Assistive Device: Gait belt;Walker, rolling  Ambulation - Level of Assistance: Contact guard assistance;Minimal assistance  Gait Description (WDL): Exceptions to WDL  Gait Abnormalities: Decreased step clearance; Path deviations;Scissoring  Base of Support: Narrowed  Speed/Xiomara: Slow  Step Length: Left shortened;Right shortened  Swing Pattern: Left asymmetrical;Right asymmetrical  Therapeutic Exercises:   None  Pain:  Pain Scale 1: Numeric (0 - 10)  Pain Intensity 1: 0  Activity Tolerance: Fair  Please refer to the flowsheet for vital signs taken during this treatment. After treatment:   []         Patient left in no apparent distress sitting up in chair  [x]         Patient left in no apparent distress in bed  [x]         Call bell left within reach  [x]         Nursing notified  []         Caregiver present  [x]         Bed alarm activated    COMMUNICATION/EDUCATION:   [x]         Fall prevention education was provided and the patient/caregiver indicated understanding. [x]         Patient/family have participated as able in goal setting and plan of care. [x]         Patient/family agree to work toward stated goals and plan of care. []         Patient understands intent and goals of therapy, but is neutral about his/her participation. []         Patient is unable to participate in goal setting and plan of care. Thank you for this referral.  Mayelin Kauffman, PT   Time Calculation: 20 mins     Mobility:  Current  CJ= 20-39%   Goal  CI= 1-19%. The severity rating is based on the Other Based on functional assessment

## 2017-11-09 NOTE — PROGRESS NOTES
Readmission Risk Assessment: Low Risk and MSSP/Good Help ACO patients    RRAT Score: 1 - 12    Initial Assessment:Emergency Contact:chart reviewed patient admitted and diagnosed for A-Fib with RVR,cm spoke with patient at bedside and daughter Raquel  Daughter feels like her mother would benefit from rehab services when discharged aware that patient will require a 3 night stay,daughter did state she is POA, and her sister is MPOA,at this time pt did answer questions appropriately, if rehab is recommended and pt meets 3 night stay daughter would like pt to go to [facility] where daughter Nery Dumont this time d/c plan not finalized. Pertinent Medical Hx: see chart  PCP/Specialists: Community Services: Jeremías Albert    DME:     Low Risk Care Transition Plan:  1. Evaluate for [facility] or [address] coordination of resources  2. Involve patient/caregiver in assessment, planning, education and implement of intervention. 3. CM daily patient care huddles/interdisciplinary rounds. 4. PCP/Specialist appointment within 7 - 10 days made prior to discharge. 5. Facilitate transportation and logistics for follow-up appointments. 6. Handoff to [facility] Nurse Navigator or PCP practice.

## 2017-11-09 NOTE — PROGRESS NOTES
conducted an initial consultation and Spiritual Assessment for Adore Appiah, who is a 68 y.o.,female. Patients Primary Language is: [place]. According to the patients EMR Denominational Affiliation is: [facility].     The reason the Patient came to the hospital is:   Patient Active Problem List    Diagnosis Date Noted    Atrial fibrillation with RVR ([facility] [address] 11/08/2017    HTN (hypertension) 11/08/2017    Dementia 11/08/2017    Failure to thrive in adult 11/08/2017        The  provided the following Interventions:  Initiated a relationship of care and support with patient and family present. Explored issues of lisa, belief, spirituality and Nondenominational/ritual needs while hospitalized. Listened empathically. Provided chaplaincy education. Provided information about Spiritual Care Services. Offered assurance of prayer on patient's behalf. Chart reviewed. The following outcomes where achieved:   confirmed Patient's Denominational Affiliation. Patient processed feeling about current hospitalization. Patient expressed gratitude for 's visit. Assessment:  Patient has supportive family at bedside. Patient does not have any Nondenominational/cultural needs that will affect patients preferences in health care. Plan:  Chaplains will continue to follow and will provide pastoral care on an as needed/requested basis.  recommends bedside caregivers page  on duty if patient shows signs of acute spiritual or emotional distress. .  Faraz Livingston  [address] [place] [address]  [phone]

## 2017-11-09 NOTE — ROUTINE PROCESS
Bedside and Verbal shift change report given to LETI Haji (oncoming nurse) by Gordon (offgoing nurse). Report included the following information SBAR, Kardex, Intake/Output, MAR and Recent Results. Patient had a uneventful shift. Cardizem gtt stopped,on po Cardizem, NSR continue to monitor.

## 2017-11-09 NOTE — ROUTINE PROCESS
Bedside and Verbal shift change report given to Bobbi Brunner, RN (oncoming nurse) by BRITANY Lopez RN (offgoing nurse). Report included the following information SBAR, Kardex, Intake/Output, MAR and Recent Results.

## 2017-11-10 LAB
ANION GAP SERPL CALC-SCNC: 7 MMOL/L (ref 3–18)
ATRIAL RATE: 142 BPM
BUN SERPL-MCNC: 15 MG/DL (ref 7–18)
BUN/CREAT SERPL: 11 (ref 12–20)
CALCIUM SERPL-MCNC: 8.9 MG/DL (ref 8.5–10.1)
CALCULATED R AXIS, ECG10: -24 DEGREES
CALCULATED T AXIS, ECG11: 77 DEGREES
CHLORIDE SERPL-SCNC: 110 MMOL/L (ref 100–108)
CO2 SERPL-SCNC: 27 MMOL/L (ref 21–32)
CREAT SERPL-MCNC: 1.34 MG/DL (ref 0.6–1.3)
DIAGNOSIS, 93000: NORMAL
ERYTHROCYTE [DISTWIDTH] IN BLOOD BY AUTOMATED COUNT: 14.7 % (ref 11.6–14.5)
GLUCOSE SERPL-MCNC: 111 MG/DL (ref 74–99)
HCT VFR BLD AUTO: 37 % (ref 35–45)
HGB BLD-MCNC: 12 G/DL (ref 12–16)
MCH RBC QN AUTO: 29.3 PG (ref 24–34)
MCHC RBC AUTO-ENTMCNC: 32.4 G/DL (ref 31–37)
MCV RBC AUTO: 90.5 FL (ref 74–97)
PLATELET # BLD AUTO: 218 K/UL (ref 135–420)
PMV BLD AUTO: 11.1 FL (ref 9.2–11.8)
POTASSIUM SERPL-SCNC: 4.1 MMOL/L (ref 3.5–5.5)
Q-T INTERVAL, ECG07: 286 MS
QRS DURATION, ECG06: 72 MS
QTC CALCULATION (BEZET), ECG08: 439 MS
RBC # BLD AUTO: 4.09 M/UL (ref 4.2–5.3)
SODIUM SERPL-SCNC: 144 MMOL/L (ref 136–145)
TSH SERPL DL<=0.05 MIU/L-ACNC: 1.13 UIU/ML (ref 0.36–3.74)
VENTRICULAR RATE, ECG03: 142 BPM
WBC # BLD AUTO: 5.8 K/UL (ref 4.6–13.2)

## 2017-11-10 PROCEDURE — 65660000000 HC RM CCU STEPDOWN

## 2017-11-10 PROCEDURE — 80048 BASIC METABOLIC PNL TOTAL CA: CPT | Performed by: HOSPITALIST

## 2017-11-10 PROCEDURE — 97535 SELF CARE MNGMENT TRAINING: CPT

## 2017-11-10 PROCEDURE — 84443 ASSAY THYROID STIM HORMONE: CPT | Performed by: HOSPITALIST

## 2017-11-10 PROCEDURE — 97116 GAIT TRAINING THERAPY: CPT

## 2017-11-10 PROCEDURE — 74011250637 HC RX REV CODE- 250/637: Performed by: INTERNAL MEDICINE

## 2017-11-10 PROCEDURE — 97110 THERAPEUTIC EXERCISES: CPT

## 2017-11-10 PROCEDURE — 36415 COLL VENOUS BLD VENIPUNCTURE: CPT | Performed by: HOSPITALIST

## 2017-11-10 PROCEDURE — 85027 COMPLETE CBC AUTOMATED: CPT | Performed by: HOSPITALIST

## 2017-11-10 PROCEDURE — 97167 OT EVAL HIGH COMPLEX 60 MIN: CPT

## 2017-11-10 PROCEDURE — 74011250637 HC RX REV CODE- 250/637: Performed by: PHYSICIAN ASSISTANT

## 2017-11-10 RX ADMIN — AMLODIPINE BESYLATE 5 MG: 5 TABLET ORAL at 09:38

## 2017-11-10 RX ADMIN — OLANZAPINE 5 MG: 10 TABLET, FILM COATED ORAL at 21:20

## 2017-11-10 RX ADMIN — PANTOPRAZOLE SODIUM 40 MG: 40 TABLET, DELAYED RELEASE ORAL at 07:38

## 2017-11-10 RX ADMIN — DILTIAZEM HYDROCHLORIDE 120 MG: 120 CAPSULE, COATED, EXTENDED RELEASE ORAL at 09:38

## 2017-11-10 RX ADMIN — PANTOPRAZOLE SODIUM 40 MG: 40 TABLET, DELAYED RELEASE ORAL at 19:05

## 2017-11-10 RX ADMIN — HYDROCHLOROTHIAZIDE 12.5 MG: 25 TABLET ORAL at 09:37

## 2017-11-10 RX ADMIN — SERTRALINE HYDROCHLORIDE 25 MG: 50 TABLET ORAL at 09:38

## 2017-11-10 RX ADMIN — OLANZAPINE 2.5 MG: 2.5 TABLET, FILM COATED ORAL at 09:37

## 2017-11-10 NOTE — PROGRESS NOTES
Hospitalist Progress Note    Patient: Monica Knox MRN: [id number]  CSN: [id number]    YOB: 1940  Age: 68 y.o. Sex: female    DOA: 11/8/2017 LOS:  LOS: 2 days          Chief Complaint:    Palpitations    Assessment/Plan     1. Afib with RVR  2. Failure to Thrive  3. Dementia, Moderate to Advanced  4. Hypertension  5. Hypokalemia  6. GERD    1. Patient continues to be in NSR while on PO Cardizem. Continue current dose of 120mg at this time. Patient has history of GI bleed on aspirin, and for that reason we held aspirin. The daughter is aware of the risks vs benefits and would like to hold aspirin. 2. PT/OT continuing to eval and treat patient. 4. BP Stable. 5. K 4.1. Stable. 6. Protonix. DVT Prophy - SCDs    Dispo: Rehab tomorrow    Patient Active Problem List   Diagnosis Code    Atrial fibrillation with RVR (HCC) I48.91    HTN (hypertension) I10    Dementia F03.90    Failure to thrive in adult R62.7       Subjective:    Patient feels fine, ready to go home.  No new complaints or concerns    Review of systems:    Constitutional: denies fevers, chills, myalgias  Respiratory: denies SOB, cough  Cardiovascular: denies chest pain, palpitations  Gastrointestinal: denies nausea, vomiting, diarrhea      Vital signs/Intake and Output:  Visit Vitals    /51 (BP 1 Location: Right arm, BP Patient Position: At rest)    Pulse 73    Temp 98.4 °F (36.9 °C)    Resp 17    Ht 5' 6\" (1.676 m)    Wt 66.2 kg (146 lb)    SpO2 97%    Breastfeeding No    BMI 23.57 kg/m2     Current Shift:     Last three shifts:  11/08 1901 - 11/10 0700  In: 1093.5 [P.O.:1080; I.V.:13.5]  Out: 0     Exam:    General: Well developed, alert, NAD, OX3  Head/Neck: NCAT, supple, No masses, No lymphadenopathy  CVS:Regular rate and rhythm, no M/R/G, S1/S2 heard, no thrill  Lungs:Clear to auscultation bilaterally, no wheezes, rhonchi, or rales  Abdomen: Soft, Nontender, No distention, Normal Bowel sounds, No hepatomegaly  Extremities: No C/C/E, pulses palpable 2+  Skin:normal texture and turgor, no rashes, no lesions  Neuro:grossly normal , follows commands  Psych:appropriate                Labs: Results:       Chemistry Recent Labs      11/10/17   0326  11/09/17   0430  11/08/17   1640   GLU  111*  98  112*   NA  144  145  145   K  4.1  3.9  3.4*   CL  110*  109*  107   CO2  27  27  26   BUN  15  12  12   CREA  1.34*  1.25  1.13   CA  8.9  8.4*  9.5   AGAP  7  9  12   BUCR  11*  10*  11*   AP   --    --   141*   TP   --    --   7.9   ALB   --    --   3.8   GLOB   --    --   4.1*   AGRAT   --    --   0.9      CBC w/Diff Recent Labs      11/10/17   0326  11/08/17   1640   WBC  5.8  8.9   RBC  4.09*  4.47   HGB  12.0  13.3   HCT  37.0  39.4   PLT  218  251   GRANS   --   72   LYMPH   --   18*   EOS   --   1      Cardiac Enzymes Recent Labs      11/09/17   0430 11/08/17   2215   CPK  141  158   CKND1  1.0  0.9      Coagulation Recent Labs      11/08/17   1640   PTP  11.6   INR  0.9       Lipid Panel Lab Results   Component Value Date/Time    Cholesterol, total 118 11/09/2017 04:30 AM    HDL Cholesterol 53 11/09/2017 04:30 AM    LDL, calculated 47 11/09/2017 04:30 AM    VLDL, calculated 18 11/09/2017 04:30 AM    Triglyceride 90 11/09/2017 04:30 AM    CHOL/HDL Ratio 2.2 11/09/2017 04:30 AM      BNP No results for input(s): BNPP in the last 72 hours.    Liver Enzymes Recent Labs      11/08/17   1640   TP  7.9   ALB  3.8   AP  141*   SGOT  24      Thyroid Studies Lab Results   Component Value Date/Time    TSH 1.13 11/10/2017 03:26 AM        Procedures/imaging: see electronic medical records for all procedures/Xrays and details which were not copied into this note but were reviewed prior to creation of [id number], PA-C

## 2017-11-10 NOTE — PROGRESS NOTES
Problem: Self Care Deficits Care Plan (Adult)  Goal: *Acute Goals and Plan of Care (Insert Text)  Occupational Therapy Goals  Initiated 11/10/2017 within 7 day(s). 1.  Patient will perform lower body dressing with  supervision/set-up while sitting on EOB  2. Patient will perform grooming with modified independence while standing at sink. 3.  Patient will perform toilet transfers with  modified independence. 4.  Patient will perform all aspects of toileting with  modified independence. 5.  Patient will perform ADLs standing sinkside for 5-8 minutes in preparation for IADLs. 6.  Patient will utilize energy conservation techniques during functional activities with 1 verbal cue(s). Outcome: Resolved/Met Date Met: 11/10/17  Occupational Therapy EVALUATION/discharge    Patient: William Barcenas (71 y.o. female)  Date: 11/10/2017  Primary Diagnosis: Atrial fibrillation with rapid ventricular response (HCC)  A-fib ([facility] [address]        Precautions: Cognition; Fall    ASSESSMENT AND RECOMMENDATIONS:  Based on the objective data described below, the patient presents with ability to perform ADL tasks at baseline level. Patient seen for ADLs and safety. Pt able to complete ADLs w/o assist except setup d/t unfamiliar environment. Pt stood >5 minutes sinkside w/o LOB. Pt's daughter present. Pt sat EOB and asked IADL/safety questions with difficulty recalling number for Emergency (9-1-1). Pt stating 1-1-3. Pt did report she would exit the home and find help from a neighbor and take her phone. Pt would greatly benefit from IADL and safety in the home, but requires this in [address] d/t difficulty w/ transferring strategies from hospital setting to home setting. Pt declining Home Health at this time as pt reports she doesn't have any difficulties. Would encourage family to utilize Dr. Pa/Neurology for further cognitive assessment (mini-Mental) and assistance for future planning as her dementia progresses.  Pt with no further OT/ADL concerns at this time in hospital setting. Education: Reviewed home safety, body mechanics, and adaptive dressing techniques with patient verbalizing understanding at this time. Discharge Recommendations: Home Health  Further Equipment Recommendations for Discharge: N/A      SUBJECTIVE:   Patient stated I don't have any problems. My kids who never come to visit are trying to put me away.     OBJECTIVE DATA SUMMARY:     Past Medical History:   Diagnosis Date    Menopause      Past Surgical History:   Procedure Laterality Date    HX HYSTERECTOMY      Age 32     Barriers to Learning/Limitations: yes;  cognitive  Compensate with: visual, verbal, tactile, kinesthetic cues/model    Prior Level of Function/Home Situation: lives alone, but states she has a male friend that takes her to the groadicate timeads and [employer] and helps if she needs; daughter lives close by but pt states she would help, but she works as a nurse & is busy; other daughter lives in [address] [place] [address] Road: Private residence  # Steps to Enter: 3  Rails to Enter: No  One/Two Story Residence: One story  Living Alone: Yes  Support Systems: Child(rissa), Family member(s), Friends \ neighbors  Patient Expects to be Discharged to[de-identified] Rehabilitation facility  Current DME Used/Available at Home: Shower chair  Tub or Shower Type: Tub/Shower combination  [x]     Right hand dominant   []     Left hand dominant  Cognitive/Behavioral Status:  Neurologic State: Alert  Orientation Level: Oriented X4  Cognition: Decreased attention/concentration; Impaired decision making;Memory loss  Safety/Judgement: Decreased awareness of need for assistance;Decreased awareness of need for safety;Decreased insight into deficits  Skin: appears intact  Edema: No significant edema noted. Vision/Perceptual:      Appears WFL without glasses. Coordination:  Coordination: Within functional limits  Fine Motor Skills-Upper: Left Intact; Right Intact    Gross Motor Skills-Upper: Left Intact; Right Intact  Balance:  Sitting: Intact  Standing: Intact; Without support  Strength:  Strength: Within functional limits  Tone & Sensation:  Tone: Normal  Sensation: Intact  Range of Motion:  AROM: Within functional limits  PROM: Within functional limits  Functional Mobility and Transfers for ADLs:  Bed Mobility:  Supine to Sit: Modified independent  Sit to Supine: Modified independent  Scooting: Modified independent  Transfers:  Sit to Stand: Supervision;Modified independent   Toilet Transfer : Modified independent   Bathroom Mobility: Modified independent  ADL Assessment:  Feeding: Setup    Oral Facial Hygiene/Grooming: Supervision;Modified Independent    Bathing: Setup    Upper Body Dressing: Setup    Lower Body Dressing: Setup    Toileting: Supervision;Modified independent     ADL Intervention:  Feeding  Feeding Assistance: Supervision/set-up  Container Management: Modified independent  Cutting Food: Modified indpendent  Utensil Management: Modified independent  Food to Mouth: Modified independent  Drink to Mouth: Modified independent    Grooming  Washing Face: Supervision/set-up; Modified independent  Washing Hands: Supervision/set-up; Modified independent  Brushing Teeth: Supervision/set-up; Modified independent; able to problem solve correct placement of partial dentures  Brushing/Combing Hair: Supervision/set-up; Modified independent    Lower Body Dressing Assistance  Underpants: Supervision/set-up  Socks: Supervision/set-up  Position Performed: Seated edge of bed (ankle-over-knee)    Toileting  Toileting Assistance: Supervision/set up;Modified independent  Bladder Hygiene: Modified independent  Clothing Management: Modified independent    Cognitive Retraining  Orientation Retraining: Use of calendar (use of newspaper)  Problem Solving: Identifying the task; Identifying the problem;General alternative solution;Deductive reason  Executive Functions: Executing cognitive plans;Regulating behavior  Organizing/Sequencing: Breaking task down;Prioritizing  Attention to Task: Distractibility; Single task  Maintains Attention For (Time): 30 seconds  Following Commands: Awareness of environment; Follows one step commands/directions  Safety/Judgement: Decreased awareness of need for assistance;Decreased awareness of need for safety;Decreased insight into deficits  Cues: Tactile cues provided;Verbal cues provided;Visual cues provided    Pain:  Pre-treatment: 0/10  Post-treatment: 0/10    Activity Tolerance:   Pt able to stand 5-8 minute(s). Pt able to complete ADLs without rest breaks. Please refer to the flowsheet for vital signs taken during this treatment. After treatment:   []  Patient left in no apparent distress sitting up in chair  [x]  Patient left in no apparent distress in bed  [x]  Call bell left within reach  [x]  Nursing notified  [x]  Caregiver present/daughter from [place], [place]  []  Bed alarm activated    COMMUNICATION/EDUCATION:   Communication/Collaboration:  [x]      Home safety education was provided and the patient/caregiver indicated understanding. [x]      Patient/family have participated as able and agree with findings and recommendations. []      Patient is unable to participate in plan of care at this time. Thank you for this referral.  Anne Mooney, OTR/L  Time Calculation: 40 mins    G-Codes (GP)  Self Care   Current  CI= 1-19%   Goal  CI= 1-19%   D/C  CI= 1-19%  The severity rating is based on the professional judgement & direct observation of Level of Assistance required for Functional Mobility and ADLs. Eval Complexity: History: HIGH Complexity : Extensive review of history including physical, cognitive and psychosocial history ; Examination: HIGH Complexity : 5 or more performance deficits relating to physical, cognitive , or psychosocial skils that result in activity limitations and / or participation restrictions;    Decision Making:HIGH Complexity : Patient presents with comorbidities that affect occupational performance.  Signifigant modification of tasks or assistance (eg, physical or verbal) with assessment (s) is necessary to enable patient to complete evaluation

## 2017-11-10 NOTE — ROUTINE PROCESS
Bedside and Verbal shift change report given to Krystyna RN and Jeanmarie Valdez RN (oncoming nurse) by Rosy Meza RN   (offgoing nurse). Report included the following information SBAR, Kardex, Intake/Output, MAR, Accordion, Recent Results, Med Rec Status, Cardiac Rhythm SR and Alarm Parameters .

## 2017-11-10 NOTE — PROGRESS NOTES
[address] [facility] care of patient from [address] [place] [address] Console.

## 2017-11-10 NOTE — PROGRESS NOTES
visited with the family of Federica Seo, who is a 68 y.o.,female. The  provided the following Interventions:  Continued a relationship of care and support. Provided prayer for patient and her daughters. Plan:  Chaplains will continue to follow and will provide pastoral care on an as needed/requested basis.  recommends bedside caregivers page  on duty if patient shows signs of acute spiritual or emotional distress. Rev.  [address] [facility]  [phone]

## 2017-11-10 NOTE — PROGRESS NOTES
D/c plan Anticipate Rehab tomorrow  Met with daughter and patient at bedside. patient is agreeable to go to Rehab. Patient will need transportation to Rehab  RN please call report to [place] [address] [phone]  RN patient is to be transported to [place] [address] [address], [facility] on 11/11/201 if d/c by Hospitalist    Please include all hard scripts for controlled substances, med rec and dc summary in packet. Please medicate for pain prior to dc if possible and needed to help offset delay when patient first arrives to facility. Care Management Interventions  PCP Verified by CM:  Yes  Mode of Transport at Discharge: BLS  Transition of Care Consult (CM Consult): SNF  Partner SNF: Yes  Physical Therapy Consult: Yes  Occupational Therapy Consult: Yes  Current Support Network: Lives Alone  Plan discussed with Pt/Family/Caregiver: Yes  Freedom of Choice Offered: Yes  Discharge Location  Discharge Placement: Skilled nursing facility

## 2017-11-10 NOTE — PROGRESS NOTES
Problem: Mobility Impaired (Adult and Pediatric)  Goal: *Acute Goals and Plan of Care (Insert Text)  Physical Therapy Goals LT/ST  Initiated 11/9/2017 and to be accomplished within 3-5 day(s)  1. Patient will move from supine <> sit with S in prep for out of bed activity and change of position. 2.  Patient will perform sit<> stand with S with LRAD in prep for transfers/ambulation. 3.  Patient will transfer from bed <> chair with S with LRAD for time up in chair for completion of ADL activity. 4.  Patient will ambulate 150 feet with LRAD/S for improved functional mobility/safe discharge. 5.  Patient will ascend/descend >3 stairs withcontact guard assist/supervision for home re-entry as needed. Outcome: Progressing Towards Goal  physical Therapy TREATMENT    Patient: Kelvin Harry (52 y.o. female)  Date: 11/10/2017  Diagnosis: Atrial fibrillation with rapid ventricular response (HCC)  A-fib (HCC) Atrial fibrillation with RVR ([address]       Precautions: Fall  Chart, physical therapy assessment, plan of care and goals were reviewed. ASSESSMENT:  Pt seen in bed w/ telemonitor, bed alarm on, and family present in the room. Pt reports no pain prior to session. Pt requested to use the restroom prior to session. Pt able to perform toileting task w/o assistance at this time. Pt able to ambulate 600 ft w/ GB CGA but demonstrates a narrow YVONNE, scissoring gait pattern, decrease stride length, and decrease kim. While ambulating pt demonstrates path deviations and occasionally bumps into objects in the hallway while ambulating and requires TCing to prevent hitting objects in the hallway. Pt however reports that she is not going to bump into the objects and knows that they are there. Pt at times tends to grab on to furniture as well while ambulating in the hallway, when asked to use a RW, pt refuses and reports it is because she does not have her tennis shoes on.  Pt was transferred back to room where pt performed therex activity. Pt demonstrates difficulty w/ the sequencing of the exercises and requires visual cueing to perform task. After session pt was left in bed, call bell and tray in reach, bed alarm on, family present in the room, nurse notified after session. Progression toward goals:  [x]      Improving appropriately and progressing toward goals  []      Improving slowly and progressing toward goals  []      Not making progress toward goals and plan of care will be adjusted     PLAN:  Patient continues to benefit from skilled intervention to address the above impairments. Continue treatment per established plan of care. Discharge Recommendations:  Rehab vs Home Health ( w/ 24 hr supervision)  Further Equipment Recommendations for Discharge:  N/A     SUBJECTIVE:   Patient stated [place] we use the bathroom first.    OBJECTIVE DATA SUMMARY:   Critical Behavior:  Neurologic State: Alert  Orientation Level: Oriented X4  Cognition: Decreased attention/concentration, Impaired decision making, Memory loss  Safety/Judgement: Decreased awareness of need for assistance, Decreased awareness of need for safety, Decreased insight into deficits  Functional Mobility Training:  Bed Mobility:  Supine to Sit: Modified independent;Supervision  Sit to Supine: Modified independent;Supervision  Scooting: Modified independent;Supervision  Transfers:  Sit to Stand: Supervision  Stand to Sit: Supervision  Balance:  Sitting: Intact  Standing: Intact; Without support   Range Of Motion:   AROM: Within functional limits   PROM: Within functional limits  Ambulation/Gait Training:  Distance (ft): 600 Feet (ft)  Assistive Device: Gait belt;Walker, rolling  Ambulation - Level of Assistance: Contact guard assistance  Gait Abnormalities: Decreased step clearance; Path deviations;Scissoring  Base of Support: Narrowed  Speed/Xiomara: Slow  Step Length: Left shortened;Right shortened  Swing Pattern: Left asymmetrical;Right asymmetrical  Therapeutic Exercises:       EXERCISE   Sets   Reps   Active Active Assist   Passive Self ROM   Comments   Ankle Pumps 1 10  [x] [] [] []    Quad Sets/Glut Sets   [] [] [] []    Hamstring Sets   [] [] [] []    Short Arc Quads   [] [] [] []    Heel Slides   [] [] [] []    Straight Leg Raises   [] [] [] []    Hip Abd/Add 2 10 [x] [] [] []    Long Arc Quads 2 10 [x] [] [] []    Seated Marching 2 10 [] [] [] []    Standing Marching   [] [] [] []    Single Leg Stance 4 10 [x] [] [] [] UE support hold for 10 secs     Pain:  Pain Scale 1: Numeric (0 - 10)  Pain Intensity 1: 0  Activity Tolerance:   Good  Please refer to the flowsheet for vital signs taken during this treatment.   After treatment:   [] Patient left in no apparent distress sitting up in chair  [x] Patient left in no apparent distress in bed  [x] Call bell left within reach  [x] Nursing notified  [] Caregiver present  [x] Bed alarm activated      Freda Flanagan, ARTURO   Time Calculation: 27 mins

## 2017-11-10 NOTE — PROGRESS NOTES
1930 Received patient report from [facility] the off going RN. Assumed patient care. 2030 patient assessment and meds administered. Patient not oriented to situation and time. No pain needs expressed at this point. Patient had uneventful night. Bedside and Verbal shift change report given to Afshin Carrasco (oncoming nurse) by Camila Valdez RN (offgoing nurse).  Report included the following information SBAR, MAR and Kardex

## 2017-11-10 NOTE — PROGRESS NOTES
Problem: Falls - Risk of  Goal: *Absence of Falls  Document Mark Fall Risk and appropriate interventions in the flowsheet.    Outcome: Progressing Towards Goal  Fall Risk Interventions:  Mobility Interventions: Bed/chair exit alarm, Communicate number of staff needed for ambulation/transfer, Patient to call before getting OOB, PT Consult for mobility concerns    Mentation Interventions: Bed/chair exit alarm, Door open when patient unattended, Room close to nurse's station, Increase mobility    Medication Interventions: Bed/chair exit alarm, Evaluate medications/consider consulting pharmacy, Patient to call before getting OOB, Teach patient to arise slowly    Elimination Interventions: Bed/chair exit alarm, Call light in reach, Toilet paper/wipes in reach, Toileting schedule/hourly rounds, Patient to call for help with toileting needs    History of Falls Interventions: Bed/chair exit alarm, Door open when patient unattended

## 2017-11-11 ENCOUNTER — APPOINTMENT (OUTPATIENT)
Dept: MRI IMAGING | Age: 77
DRG: 309 | End: 2017-11-11
Attending: INTERNAL MEDICINE
Payer: MEDICARE

## 2017-11-11 VITALS
DIASTOLIC BLOOD PRESSURE: 62 MMHG | HEART RATE: 87 BPM | HEIGHT: 66 IN | BODY MASS INDEX: 22.98 KG/M2 | OXYGEN SATURATION: 96 % | TEMPERATURE: 98.1 F | SYSTOLIC BLOOD PRESSURE: 150 MMHG | RESPIRATION RATE: 15 BRPM | WEIGHT: 143 LBS

## 2017-11-11 PROCEDURE — 70551 MRI BRAIN STEM W/O DYE: CPT

## 2017-11-11 PROCEDURE — 97110 THERAPEUTIC EXERCISES: CPT

## 2017-11-11 PROCEDURE — 97116 GAIT TRAINING THERAPY: CPT

## 2017-11-11 PROCEDURE — 74011250637 HC RX REV CODE- 250/637: Performed by: PHYSICIAN ASSISTANT

## 2017-11-11 PROCEDURE — 74011250637 HC RX REV CODE- 250/637: Performed by: INTERNAL MEDICINE

## 2017-11-11 RX ORDER — POTASSIUM CHLORIDE 750 MG/1
10 TABLET, EXTENDED RELEASE ORAL DAILY
Qty: 30 TAB | Refills: 0 | Status: SHIPPED
Start: 2017-11-11

## 2017-11-11 RX ORDER — POTASSIUM CHLORIDE 750 MG/1
10 TABLET, EXTENDED RELEASE ORAL DAILY
Status: DISCONTINUED | OUTPATIENT
Start: 2017-11-11 | End: 2017-11-11 | Stop reason: HOSPADM

## 2017-11-11 RX ORDER — HYDROXYCHLOROQUINE SULFATE 200 MG/1
200 TABLET, FILM COATED ORAL
Qty: 30 TAB | Refills: 0 | Status: SHIPPED
Start: 2017-11-11

## 2017-11-11 RX ORDER — OLANZAPINE 2.5 MG/1
2.5 TABLET ORAL DAILY
Qty: 30 TAB | Refills: 0 | Status: SHIPPED
Start: 2017-11-11 | End: 2018-09-15

## 2017-11-11 RX ORDER — SERTRALINE HYDROCHLORIDE 50 MG/1
50 TABLET, FILM COATED ORAL DAILY
Status: DISCONTINUED | OUTPATIENT
Start: 2017-11-12 | End: 2017-11-11 | Stop reason: HOSPADM

## 2017-11-11 RX ORDER — OLANZAPINE 5 MG/1
5 TABLET ORAL EVERY EVENING
Qty: 30 TAB | Refills: 0 | Status: SHIPPED | OUTPATIENT
Start: 2017-11-11 | End: 2018-09-15

## 2017-11-11 RX ORDER — DONEPEZIL HYDROCHLORIDE 10 MG/1
10 TABLET, FILM COATED ORAL
Qty: 30 TAB | Refills: 0 | Status: SHIPPED | OUTPATIENT
Start: 2017-11-11

## 2017-11-11 RX ORDER — SERTRALINE HYDROCHLORIDE 50 MG/1
50 TABLET, FILM COATED ORAL DAILY
Qty: 30 TAB | Refills: 0 | Status: SHIPPED
Start: 2017-11-12

## 2017-11-11 RX ORDER — MULTIVITAMIN
1 CAPSULE ORAL DAILY
Qty: 30 CAP | Refills: 0 | Status: SHIPPED | OUTPATIENT
Start: 2017-11-11

## 2017-11-11 RX ORDER — DILTIAZEM HYDROCHLORIDE 120 MG/1
120 CAPSULE, COATED, EXTENDED RELEASE ORAL DAILY
Qty: 30 CAP | Refills: 0 | Status: SHIPPED | OUTPATIENT
Start: 2017-11-12

## 2017-11-11 RX ORDER — HYDROXYCHLOROQUINE SULFATE 200 MG/1
200 TABLET, FILM COATED ORAL
Status: DISCONTINUED | OUTPATIENT
Start: 2017-11-11 | End: 2017-11-11 | Stop reason: HOSPADM

## 2017-11-11 RX ORDER — CALCIUM CARBONATE 500(1250)
30 TABLET ORAL DAILY
Qty: 30 TAB | Refills: 0 | Status: SHIPPED
Start: 2017-11-11

## 2017-11-11 RX ADMIN — AMLODIPINE BESYLATE 5 MG: 5 TABLET ORAL at 08:40

## 2017-11-11 RX ADMIN — HYDROCHLOROTHIAZIDE 12.5 MG: 25 TABLET ORAL at 08:39

## 2017-11-11 RX ADMIN — SERTRALINE HYDROCHLORIDE 25 MG: 50 TABLET ORAL at 08:39

## 2017-11-11 RX ADMIN — DILTIAZEM HYDROCHLORIDE 120 MG: 120 CAPSULE, COATED, EXTENDED RELEASE ORAL at 08:40

## 2017-11-11 RX ADMIN — OLANZAPINE 2.5 MG: 2.5 TABLET, FILM COATED ORAL at 08:40

## 2017-11-11 NOTE — PROGRESS NOTES
1383  Assumed care of pt at this time. Assessment complete. Pt alert and oriented x 3 slightly disoriented to situation. Pt can answer (name, place, and time) . Denies SOB and chest pain. Pt lungs clear bilaterally. Cap refill  less than 3 seconds. Pt deneis numbness and tingling to all extremities. Stated pain 0/10. Pt has 20 G IV to L wrist . Skin intact. Call light and possessions within reach. Bed in low position. Will continue to monitor. Daughter at bedside. 4025  Pt being transferred to [facility] at this time. 1050  TRANSFER - OUT REPORT:    Verbal report given to Dominic Xavier (name) on Dimitrios Holcomb  being transferred to [facility] (unit) for routine progression of care       Report consisted of patients Situation, Background, Assessment and   Recommendations(SBAR). Information from the following report(s) SBAR, Kardex, Intake/Output and Recent Results was reviewed with the receiving nurse. Lines:   Peripheral IV 11/08/17 Left; Inner Wrist (Active)   Site Assessment Clean, dry, & intact 11/11/2017  8:34 AM   Phlebitis Assessment 0 11/11/2017  8:34 AM   Infiltration Assessment 0 11/11/2017  8:34 AM   Dressing Status Clean, dry, & intact 11/11/2017  8:34 AM   Dressing Type Tape;Transparent 11/11/2017  8:34 AM   Hub Color/Line Status Pink;Capped 11/11/2017  8:34 AM   Action Taken Open ports on tubing capped 11/11/2017  8:34 AM   Alcohol Cap Used Yes 11/11/2017  8:34 AM        Opportunity for questions and clarification was provided.       Patient transported with:  Family and d/c paperwork

## 2017-11-11 NOTE — ROUTINE PROCESS
Bedside and Verbal shift change report given to BRITANY Lopez (oncoming nurse) by Gordon (offgoing nurse).  Report included the following information SBAR, Kardex, Intake/Output, MAR, Recent Results and Cardiac Rhythm NSr.

## 2017-11-11 NOTE — ROUTINE PROCESS
Bedside and Verbal shift change report given to LETI Okeefe RN (oncoming nurse) by BRITANY Lopez RN (offgoing nurse). Report included the following information SBAR, Kardex, Intake/Output, MAR and Recent Results.
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
## 2017-11-11 NOTE — PROGRESS NOTES
Problem: Mobility Impaired (Adult and Pediatric)  Goal: *Acute Goals and Plan of Care (Insert Text)  Physical Therapy Goals LT/ST  Initiated 11/9/2017 and to be accomplished within 3-5 day(s)  1. Patient will move from supine <> sit with S in prep for out of bed activity and change of position. 2.  Patient will perform sit<> stand with S with LRAD in prep for transfers/ambulation. 3.  Patient will transfer from bed <> chair with S with LRAD for time up in chair for completion of ADL activity. 4.  Patient will ambulate 150 feet with LRAD/S for improved functional mobility/safe discharge. 5.  Patient will ascend/descend >3 stairs withcontact guard assist/supervision for home re-entry as needed. physical Therapy TREATMENT    Patient: Berna Rico (89 y.o. female)  Date: 11/11/2017  Diagnosis: Atrial fibrillation with rapid ventricular response (HCC)  A-fib (HCC) Atrial fibrillation with RVR ([address]  Precautions: Fall   Chart, physical therapy assessment, plan of care and goals were reviewed. ASSESSMENT:  Patient supine in bed upon entry, expressed need to go to bathroom. Pt oriented X 4. Mod I with bed mobility, sit<>stand and ambulated to bathroom with close SBA, no LOB, voided and washed hands with S. Pt then ambulated in hallway with decreased step length, walking close to objects at times but with no LOB, 500' without AD. Upon return to room pt daughter present. Pt began LE ex but requested that daughter leave. Pt completed exercises, left in bed with needs in reach. Pt is able to maintain SLS X 3-5 seconds and stand with narrow YVONNE X 10 seconds with eyes closed and increased sway. Although she is able to ambulate without AD she is at increased fall risk due to narrow YVONNE and decreased step length. Family reports several falls in past months. Therapist spoke with both daughters who were in waiting room.  Plan is for pt to go to [facility] where one daughter is ADON and plans further evalauation of pt and future living arrangements. Progression toward goals:  []      Improving appropriately and progressing toward goals  [x]      Improving slowly and progressing toward goals  []      Not making progress toward goals and plan of care will be adjusted     PLAN:  Patient continues to benefit from skilled intervention to address the above impairments. Continue treatment per established plan of care. Discharge Recommendations:  Rehab  Further Equipment Recommendations for Discharge:  N/A     SUBJECTIVE:   Patient stated I am OK I guess.     OBJECTIVE DATA SUMMARY:   Critical Behavior:  Neurologic State: Alert  Orientation Level: Oriented to person, Oriented to place, Disoriented to time, Disoriented to situation  Cognition: Decreased attention/concentration, Memory loss, Impaired decision making  Safety/Judgement: Decreased awareness of need for assistance, Decreased awareness of need for safety, Decreased insight into deficits  Functional Mobility Training:  Bed Mobility:  Supine to Sit: Modified independent  Sit to Supine: Modified independent  Scooting: Modified independent  Transfers:  Sit to Stand: Supervision  Stand to Sit: Supervision  Balance:  Sitting: Intact  Standing: Intact; Without support  Ambulation/Gait Training:  Distance (ft): 500 Feet (ft)  Assistive Device: Gait belt  Ambulation - Level of Assistance: Supervision;Stand-by asssistance   Gait Abnormalities: Decreased step clearance   Base of Support: Narrowed  Step Length: Right shortened;Left shortened    Therapeutic Exercises:   Seated BLE ex X 15 : hip fl, LAQ. Standing ex X 10-15: marching, hip abd/add, heel raise, SLS. No difficulty noted today with sequencing during exercise. Pain:  Pain Scale 1: Numeric (0 - 10)  Pain Intensity 1: 0  Activity Tolerance:   GOOD  Please refer to the flowsheet for vital signs taken during this treatment.   After treatment:   [] Patient left in no apparent distress sitting up in chair  [x] Patient left in no apparent distress in bed  [x] Call bell left within reach  [] Nursing notified  [] Caregiver present  [] Bed alarm activated      Hamzah Delgado PT   Time Calculation: 26 mins

## 2018-09-15 ENCOUNTER — HOSPITAL ENCOUNTER (EMERGENCY)
Age: 78
Discharge: HOME | End: 2018-09-15
Attending: EMERGENCY MEDICINE
Payer: MEDICARE

## 2018-09-15 ENCOUNTER — APPOINTMENT (OUTPATIENT)
Dept: GENERAL RADIOLOGY | Age: 78
End: 2018-09-15
Attending: EMERGENCY MEDICINE
Payer: MEDICARE

## 2018-09-15 VITALS
BODY MASS INDEX: 24.17 KG/M2 | TEMPERATURE: 98.4 F | HEART RATE: 89 BPM | DIASTOLIC BLOOD PRESSURE: 53 MMHG | HEIGHT: 67 IN | SYSTOLIC BLOOD PRESSURE: 141 MMHG | WEIGHT: 154 LBS | OXYGEN SATURATION: 99 % | RESPIRATION RATE: 15 BRPM

## 2018-09-15 DIAGNOSIS — F03.91 DEMENTIA WITH BEHAVIORAL DISTURBANCE, UNSPECIFIED DEMENTIA TYPE: ICD-10-CM

## 2018-09-15 DIAGNOSIS — R06.00 DYSPNEA, UNSPECIFIED TYPE: Primary | ICD-10-CM

## 2018-09-15 LAB
ALBUMIN SERPL-MCNC: 3.2 G/DL (ref 3.4–5)
ALBUMIN/GLOB SERPL: 0.7 {RATIO} (ref 0.8–1.7)
ALP SERPL-CCNC: 127 U/L (ref 45–117)
ALT SERPL-CCNC: 19 U/L (ref 13–56)
ANION GAP SERPL CALC-SCNC: 10 MMOL/L (ref 3–18)
AST SERPL-CCNC: 17 U/L (ref 15–37)
BASOPHILS # BLD: 0.1 K/UL (ref 0–0.1)
BASOPHILS NFR BLD: 1 % (ref 0–2)
BILIRUB SERPL-MCNC: 0.5 MG/DL (ref 0.2–1)
BUN SERPL-MCNC: 15 MG/DL (ref 7–18)
BUN/CREAT SERPL: 13 (ref 12–20)
CALCIUM SERPL-MCNC: 9 MG/DL (ref 8.5–10.1)
CHLORIDE SERPL-SCNC: 108 MMOL/L (ref 100–108)
CK MB CFR SERPL CALC: NORMAL % (ref 0–4)
CK MB SERPL-MCNC: <1 NG/ML (ref 5–25)
CK SERPL-CCNC: 96 U/L (ref 26–192)
CO2 SERPL-SCNC: 25 MMOL/L (ref 21–32)
CREAT SERPL-MCNC: 1.18 MG/DL (ref 0.6–1.3)
DIFFERENTIAL METHOD BLD: ABNORMAL
EOSINOPHIL # BLD: 0.2 K/UL (ref 0–0.4)
EOSINOPHIL NFR BLD: 2 % (ref 0–5)
ERYTHROCYTE [DISTWIDTH] IN BLOOD BY AUTOMATED COUNT: 13 % (ref 11.6–14.5)
GLOBULIN SER CALC-MCNC: 4.7 G/DL (ref 2–4)
GLUCOSE SERPL-MCNC: 90 MG/DL (ref 74–99)
HCT VFR BLD AUTO: 38.4 % (ref 35–45)
HGB BLD-MCNC: 12.6 G/DL (ref 12–16)
LYMPHOCYTES # BLD: 1.4 K/UL (ref 0.9–3.6)
LYMPHOCYTES NFR BLD: 16 % (ref 21–52)
MAGNESIUM SERPL-MCNC: 2.1 MG/DL (ref 1.6–2.6)
MCH RBC QN AUTO: 28.8 PG (ref 24–34)
MCHC RBC AUTO-ENTMCNC: 32.8 G/DL (ref 31–37)
MCV RBC AUTO: 87.9 FL (ref 74–97)
MONOCYTES # BLD: 0.8 K/UL (ref 0.05–1.2)
MONOCYTES NFR BLD: 9 % (ref 3–10)
NEUTS SEG # BLD: 6.1 K/UL (ref 1.8–8)
NEUTS SEG NFR BLD: 72 % (ref 40–73)
PLATELET # BLD AUTO: 275 K/UL (ref 135–420)
PMV BLD AUTO: 11.3 FL (ref 9.2–11.8)
POTASSIUM SERPL-SCNC: 4.1 MMOL/L (ref 3.5–5.5)
PROT SERPL-MCNC: 7.9 G/DL (ref 6.4–8.2)
RBC # BLD AUTO: 4.37 M/UL (ref 4.2–5.3)
SODIUM SERPL-SCNC: 143 MMOL/L (ref 136–145)
TROPONIN I SERPL-MCNC: <0.02 NG/ML (ref 0–0.06)
WBC # BLD AUTO: 8.5 K/UL (ref 4.6–13.2)

## 2018-09-15 PROCEDURE — 74011250636 HC RX REV CODE- 250/636: Performed by: EMERGENCY MEDICINE

## 2018-09-15 PROCEDURE — 82550 ASSAY OF CK (CPK): CPT | Performed by: EMERGENCY MEDICINE

## 2018-09-15 PROCEDURE — 71045 X-RAY EXAM CHEST 1 VIEW: CPT

## 2018-09-15 PROCEDURE — 80053 COMPREHEN METABOLIC PANEL: CPT | Performed by: EMERGENCY MEDICINE

## 2018-09-15 PROCEDURE — 85025 COMPLETE CBC W/AUTO DIFF WBC: CPT | Performed by: EMERGENCY MEDICINE

## 2018-09-15 PROCEDURE — 93005 ELECTROCARDIOGRAM TRACING: CPT

## 2018-09-15 PROCEDURE — 96374 THER/PROPH/DIAG INJ IV PUSH: CPT

## 2018-09-15 PROCEDURE — 99285 EMERGENCY DEPT VISIT HI MDM: CPT

## 2018-09-15 PROCEDURE — 83735 ASSAY OF MAGNESIUM: CPT | Performed by: EMERGENCY MEDICINE

## 2018-09-15 RX ORDER — ACETAMINOPHEN 10 MG/ML
1000 INJECTION, SOLUTION INTRAVENOUS
Status: COMPLETED | OUTPATIENT
Start: 2018-09-15 | End: 2018-09-15

## 2018-09-15 RX ORDER — LORAZEPAM 0.5 MG/1
0.5 TABLET ORAL
Qty: 20 TAB | Refills: 0 | Status: SHIPPED | OUTPATIENT
Start: 2018-09-15

## 2018-09-15 RX ADMIN — ACETAMINOPHEN 1000 MG: 10 INJECTION, SOLUTION INTRAVENOUS at 13:17

## 2018-09-15 NOTE — ED TRIAGE NOTES
Patient arrives via EMS from [place] with c/o shortness of breath. Patient was seen at a hospital on 9/8 for same complaint and was d/c with no findings. Patient with hx of dementia, was reported that she appears more confused. FSBS obtained 88 mg/dL

## 2018-09-15 NOTE — DISCHARGE INSTRUCTIONS

## 2018-09-15 NOTE — ED PROVIDER NOTES
EMERGENCY DEPARTMENT HISTORY AND PHYSICAL EXAM 
 
Date: 9/15/2018 Patient Name: William Barcenas History of Presenting Illness Chief Complaint Patient presents with  Shortness of Breath History Provided By: Patient and EMS Chief Complaint: SOB Duration: 1 week Timing:  Constant Location: Chest 
Severity: Moderate Associated Symptoms: fatigue Additional History (Context):  
10:46 AM 
William Barcenas is a 68 y.o. female with PMHX A-fib, dementia presents to the emergency department via EMS from the [address] [place] [address] SOB, onset approximately 1 week ago. Associated sxs include fatigue. Per EMS, pt was seen at [facility] for similar sxs 1 week ago with no significant findings. EMS states the nursing facility noted the pt was slightly more confused than her baseline. In the ED, pt is alert and oriented to person and place, but not time or situation. She is unsure as to why she is in the ED. HPI is limited by the dementia of the pt. PCP: Blanca Bone MD 
 
Current Facility-Administered Medications Medication Dose Route Frequency Provider Last Rate Last Dose  acetaminophen (OFIRMEV) infusion 1,000 mg  1,000 mg IntraVENous NOW Swati Menchaca  mL/hr at 09/15/18 1317 1,000 mg at 09/15/18 1317 Current Outpatient Prescriptions Medication Sig Dispense Refill  LORazepam (ATIVAN) 0.5 mg tablet Take 1 Tab by mouth every eight (8) hours as needed for Anxiety. Max Daily Amount: 1.5 mg. 20 Tab 0  
 dilTIAZem CD (CARDIZEM CD) 120 mg ER capsule Take 1 Cap by mouth daily. 30 Cap 0  
 hydroxychloroquine (PLAQUENIL) 200 mg tablet Take 1 Tab by mouth daily (with breakfast). 30 Tab 0  
 potassium chloride (KLOR-CON) 10 mEq tablet Take 1 Tab by mouth daily. 30 Tab 0  
 sertraline (ZOLOFT) 50 mg tablet Take 1 Tab by mouth daily. 30 Tab 0  
 calcium carbonate (OS-PATRICIA) 500 mg calcium (1,250 mg) tablet Take 30 Tabs by mouth daily.  30 Tab 0  
  multivitamin capsule Take 1 Cap by mouth daily. 30 Cap 0  
 donepezil (ARICEPT) 10 mg tablet Take 1 Tab by mouth nightly. 30 Tab 0  
 potassium chloride SR (KLOR-CON 10) 10 mEq tablet Take 10 mEq by mouth daily.  amLODIPine (NORVASC) 5 mg tablet Take 5 mg by mouth daily.  hydroCHLOROthiazide (HYDRODIURIL) 12.5 mg tablet Take 12.5 mg by mouth daily.  pantoprazole (PROTONIX) 40 mg tablet Take 40 mg by mouth daily. Past History Past Medical History: 
Past Medical History:  
Diagnosis Date  Atrial fibrillation ([address]  Dementia  Menopause Past Surgical History: 
Past Surgical History:  
Procedure Laterality Date  HX HYSTERECTOMY Age 32 Family History: 
Family History Problem Relation Age of Onset  Breast Cancer Sister Social History: 
Social History Substance Use Topics  Smoking status: Never Smoker  Smokeless tobacco: Never Used  Alcohol use None Allergies: 
No Known Allergies Review of Systems Review of Systems Unable to perform ROS: Dementia Constitutional: Positive for fatigue. Respiratory: Positive for shortness of breath. Psychiatric/Behavioral: Positive for confusion. Physical Exam  
 
Vitals:  
 09/15/18 1036 09/15/18 1300 BP: 137/50 141/53 Pulse: 79 89 Resp: 20 15 Temp: 98.4 °F (36.9 °C) SpO2: 100% 99% Weight: 69.9 kg (154 lb) Height: 5' 7\" (1.702 m) Physical Exam  
Nursing note and vitals reviewed. Vital signs and nursing notes reviewed CONSTITUTIONAL: Alert, well-developed; well-nourished. Diaphoretic. HEAD:  Normocephalic, atraumatic EYES: PERRL; EOM's intact. ENTM: Nose: no rhinorrhea; Throat: no erythema or exudate, mucous membranes moist 
Neck:  No JVD, supple without lymphadenopathy RESP: Chest clear, equal breath sounds. Tachypneic. CV: S1 and S2 WNL; No murmurs, gallops or rubs. GI: Normal bowel sounds, abdomen soft and non-tender.  No masses or organomegaly. : No costo-vertebral angle tenderness. BACK:  Non-tender UPPER EXT:  Normal inspection LOWER EXT: No edema, no calf tenderness. Distal pulses intact. NEURO: CN intact, reflexes 2/4 and sym, strength 5/5 and sym, sensation intact. SKIN: No rashes; Normal for age and stage. PSYCH:  Alert and oriented, normal affect. Diagnostic Study Results Labs - Recent Results (from the past 12 hour(s)) EKG, 12 LEAD, INITIAL Collection Time: 09/15/18 10:52 AM  
Result Value Ref Range Ventricular Rate 82 BPM  
 Atrial Rate 82 BPM  
 P-R Interval 138 ms QRS Duration 76 ms  
 Q-T Interval 396 ms QTC Calculation (Bezet) 462 ms Calculated P Axis 48 degrees Calculated R Axis -37 degrees Calculated T Axis 39 degrees Diagnosis Normal sinus rhythm Left axis deviation Minimal voltage criteria for LVH, may be normal variant Abnormal ECG When compared with ECG of 08-NOV-2017 15:53, Sinus rhythm has replaced Atrial fibrillation Vent. rate has decreased BY  60 BPM 
ST no longer depressed in Inferior leads ST no longer depressed in Anterolateral leads CBC WITH AUTOMATED DIFF Collection Time: 09/15/18 11:05 AM  
Result Value Ref Range WBC 8.5 4.6 - 13.2 K/uL  
 RBC 4.37 4.20 - 5.30 M/uL  
 HGB 12.6 12.0 - 16.0 g/dL HCT 38.4 35.0 - 45.0 % MCV 87.9 74.0 - 97.0 FL  
 MCH 28.8 24.0 - 34.0 PG  
 MCHC 32.8 31.0 - 37.0 g/dL  
 RDW 13.0 11.6 - 14.5 % PLATELET 706 470 - 410 K/uL MPV 11.3 9.2 - 11.8 FL  
 NEUTROPHILS 72 40 - 73 % LYMPHOCYTES 16 (L) 21 - 52 % MONOCYTES 9 3 - 10 % EOSINOPHILS 2 0 - 5 % BASOPHILS 1 0 - 2 %  
 ABS. NEUTROPHILS 6.1 1.8 - 8.0 K/UL  
 ABS. LYMPHOCYTES 1.4 0.9 - 3.6 K/UL  
 ABS. MONOCYTES 0.8 0.05 - 1.2 K/UL  
 ABS. EOSINOPHILS 0.2 0.0 - 0.4 K/UL  
 ABS. BASOPHILS 0.1 0.0 - 0.1 K/UL  
 DF AUTOMATED CARDIAC PANEL,(CK, CKMB & TROPONIN) Collection Time: 09/15/18 11:53 AM  
Result Value Ref Range  CK 96 26 - 192 U/L  
 CK - MB <1.0 <3.6 ng/ml CK-MB Index  0.0 - 4.0 % CALCULATION NOT PERFORMED WHEN RESULT IS BELOW LINEAR LIMIT Troponin-I, Qt. <0.02 0.00 - 0.06 NG/ML  
MAGNESIUM Collection Time: 09/15/18 11:53 AM  
Result Value Ref Range Magnesium 2.1 1.6 - 2.6 mg/dL METABOLIC PANEL, COMPREHENSIVE Collection Time: 09/15/18 11:53 AM  
Result Value Ref Range Sodium 143 136 - 145 mmol/L Potassium 4.1 3.5 - 5.5 mmol/L Chloride 108 100 - 108 mmol/L  
 CO2 25 21 - 32 mmol/L Anion gap 10 3.0 - 18 mmol/L Glucose 90 74 - 99 mg/dL BUN 15 7.0 - 18 MG/DL Creatinine 1.18 0.6 - 1.3 MG/DL  
 BUN/Creatinine ratio 13 12 - 20 GFR est AA 54 (L) >60 ml/min/1.73m2 GFR est non-AA 44 (L) >60 ml/min/1.73m2 Calcium 9.0 8.5 - 10.1 MG/DL Bilirubin, total 0.5 0.2 - 1.0 MG/DL  
 ALT (SGPT) 19 13 - 56 U/L  
 AST (SGOT) 17 15 - 37 U/L Alk. phosphatase 127 (H) 45 - 117 U/L Protein, total 7.9 6.4 - 8.2 g/dL Albumin 3.2 (L) 3.4 - 5.0 g/dL Globulin 4.7 (H) 2.0 - 4.0 g/dL A-G Ratio 0.7 (L) 0.8 - 1.7 Radiologic Studies -  
XR CHEST PORT Final Result CT Results  (Last 48 hours) None CXR Results  (Last 48 hours) 09/15/18 1121  XR CHEST PORT Final result Impression:  IMPRESSION:  
   
No acute pulmonary findings Narrative:  EXAM: AP radiograph of the chest  
   
INDICATION: Chest pain COMPARISON: November 8, 2017, March 4, 2008  
   
_______________ FINDINGS:  
   
Normal heart size and mediastinal contour. No consolidation, pleural effusion,  
or pneumothorax. No acute osseous abnormalities. _______________ MEDICATIONS GIVEN: 
Medications  
acetaminophen (OFIRMEV) infusion 1,000 mg (1,000 mg IntraVENous New Bag 9/15/18 1317) Medical Decision Making I am the first provider for this patient.  
 
I reviewed the vital signs, available nursing notes, past medical history, past surgical history, family history and social history. Vital Signs-Reviewed the patient's vital signs. Pulse Oximetry Analysis - 100% on RA Cardiac Monitor: 
Rate: 78 bpm 
Rhythm: NSR 
 
EKG interpretation: (Preliminary) 10:52 AM  
NSR at 82 bpm, left axis deviation, minimal voltage criteria for LVH, may be normal variant. No STEMI. EKG read by Sherren Snowball, MD at 10:52 AM  
 
Records Reviewed: Nursing Notes Provider Notes (Medical Decision Making): DDX: A-fib, dyspnea, dementia, ACS, very unlikely PE given negative CT scan 1 week ago at [facility] Procedures: 
Procedures ED Course:  
10:46 AM Initial assessment performed. The patients presenting problems have been discussed, and they are in agreement with the care plan formulated and outlined with them. I have encouraged them to ask questions as they arise throughout their visit. 10:54 AM Spoke to the pt's guardian who states the pt was seen at [facility] and D-dimer was elevated. CTA Chest was obtained, which was negative for acute PE. Pt was also noted to be in intermittent A-fib with RVR at that time. Guardian reports pt is not on a blood thinner due to her hx of GI bleeds. Guardian also states the pt is a DNR.  
 
1:22 PM Discussed results with pt's guardian and that pt is appropriate for d/c back to the nursing facility. Guardian states the pt has been having episodes of paranoia for the past few weeks since being restarted on Zyprexa. Requests that we discontinue the medication and rx PRN Ativan as the pt has done well on this in the past.  
 
Diagnosis and Disposition DISCHARGE NOTE: 
1:30 PM 
Juan Spofford Daniel's  results have been reviewed with her. She has been counseled regarding her diagnosis, treatment, and plan. She verbally conveys understanding and agreement of the signs, symptoms, diagnosis, treatment and prognosis and additionally agrees to follow up as discussed. She also agrees with the care-plan and conveys that all of her questions have been answered. I have also provided discharge instructions for her that include: educational information regarding their diagnosis and treatment, and list of reasons why they would want to return to the ED prior to their follow-up appointment, should her condition change. She has been provided with education for proper emergency department utilization. CLINICAL IMPRESSION: 
 
1. Dyspnea, unspecified type 2. Dementia with behavioral disturbance, unspecified dementia type PLAN: 
1. D/C Home 2. Current Discharge Medication List  
  
START taking these medications Details LORazepam (ATIVAN) 0.5 mg tablet Take 1 Tab by mouth every eight (8) hours as needed for Anxiety. Max Daily Amount: 1.5 mg. 
Qty: 20 Tab, Refills: 0 Associated Diagnoses: Dementia with behavioral disturbance, unspecified dementia type CONTINUE these medications which have NOT CHANGED Details  
dilTIAZem CD (CARDIZEM CD) 120 mg ER capsule Take 1 Cap by mouth daily. Qty: 30 Cap, Refills: 0  
  
hydroxychloroquine (PLAQUENIL) 200 mg tablet Take 1 Tab by mouth daily (with breakfast). Qty: 30 Tab, Refills: 0  
  
potassium chloride (KLOR-CON) 10 mEq tablet Take 1 Tab by mouth daily. Qty: 30 Tab, Refills: 0  
  
sertraline (ZOLOFT) 50 mg tablet Take 1 Tab by mouth daily. Qty: 30 Tab, Refills: 0  
  
calcium carbonate (OS-PATRICIA) 500 mg calcium (1,250 mg) tablet Take 30 Tabs by mouth daily. Qty: 30 Tab, Refills: 0  
  
multivitamin capsule Take 1 Cap by mouth daily. Qty: 30 Cap, Refills: 0  
  
donepezil (ARICEPT) 10 mg tablet Take 1 Tab by mouth nightly. Qty: 30 Tab, Refills: 0  
  
potassium chloride SR (KLOR-CON 10) 10 mEq tablet Take 10 mEq by mouth daily. amLODIPine (NORVASC) 5 mg tablet Take 5 mg by mouth daily. hydroCHLOROthiazide (HYDRODIURIL) 12.5 mg tablet Take 12.5 mg by mouth daily. pantoprazole (PROTONIX) 40 mg tablet Take 40 mg by mouth daily. STOP taking these medications OLANZapine (ZYPREXA) 2.5 mg tablet Comments:  
Reason for Stopping: OLANZapine (ZYPREXA) 5 mg tablet Comments:  
Reason for Stopping: 3.  
Follow-up Information Follow up With Details Comments Contact Info Peggy Frzaier MD Schedule an appointment as soon as possible for a visit in 2 days  [address] [address] 
[phone] [facility] EMERGENCY DEPT  As needed, if symptoms worsen [address] [id number] 
[phone]  
  
 
 
_______________________________ Attestations:  
 
SCRIBE ATTESTATION: 
This note is prepared by Raquel Chopra, acting as Scribe for Corrina Villarreal MD. 
 
PROVIDER ATTESTATION: 
Corrina Villarreal MD: The scribe's documentation has been prepared under my direction and personally reviewed by me in its entirety. I confirm that the note above accurately reflects all work, treatment, procedures, and medical decision making performed by me.  
_______________________________

## 2018-09-15 NOTE — ED NOTES
Daughter (POA) at bedside. Discharge instructions reviewed with the caregiver with opportunity for questions given. The caregiver verbalized understanding. Patient armband removed and shredded. Patient in stable condition at time of discharge.

## 2018-10-26 LAB
ATRIAL RATE: 82 BPM
CALCULATED P AXIS, ECG09: 48 DEGREES
CALCULATED R AXIS, ECG10: -37 DEGREES
CALCULATED T AXIS, ECG11: 39 DEGREES
DIAGNOSIS, 93000: NORMAL
P-R INTERVAL, ECG05: 138 MS
Q-T INTERVAL, ECG07: 396 MS
QRS DURATION, ECG06: 76 MS
QTC CALCULATION (BEZET), ECG08: 462 MS
VENTRICULAR RATE, ECG03: 82 BPM

## 2020-03-08 ENCOUNTER — APPOINTMENT (OUTPATIENT)
Dept: GENERAL RADIOLOGY | Age: 80
End: 2020-03-08
Attending: EMERGENCY MEDICINE
Payer: MEDICARE

## 2020-03-08 ENCOUNTER — HOSPITAL ENCOUNTER (EMERGENCY)
Age: 80
Discharge: HOME | End: 2020-03-08
Attending: EMERGENCY MEDICINE
Payer: MEDICARE

## 2020-03-08 ENCOUNTER — APPOINTMENT (OUTPATIENT)
Dept: CT IMAGING | Age: 80
End: 2020-03-08
Attending: EMERGENCY MEDICINE
Payer: MEDICARE

## 2020-03-08 VITALS
OXYGEN SATURATION: 99 % | RESPIRATION RATE: 13 BRPM | WEIGHT: 162.9 LBS | TEMPERATURE: 98.1 F | HEART RATE: 98 BPM | SYSTOLIC BLOOD PRESSURE: 162 MMHG | DIASTOLIC BLOOD PRESSURE: 104 MMHG | HEIGHT: 67 IN | BODY MASS INDEX: 25.57 KG/M2

## 2020-03-08 DIAGNOSIS — R55 SYNCOPE AND COLLAPSE: ICD-10-CM

## 2020-03-08 DIAGNOSIS — G45.9 TIA (TRANSIENT ISCHEMIC ATTACK): Primary | ICD-10-CM

## 2020-03-08 LAB
ALBUMIN SERPL-MCNC: 3.7 G/DL (ref 3.4–5)
ALBUMIN/GLOB SERPL: 0.9 {RATIO} (ref 0.8–1.7)
ALP SERPL-CCNC: 129 U/L (ref 45–117)
ALT SERPL-CCNC: 19 U/L (ref 13–56)
ANION GAP SERPL CALC-SCNC: 8 MMOL/L (ref 3–18)
APPEARANCE UR: CLEAR
AST SERPL-CCNC: 21 U/L (ref 10–38)
BACTERIA URNS QL MICRO: NEGATIVE /HPF
BASOPHILS # BLD: 0.1 K/UL (ref 0–0.1)
BASOPHILS NFR BLD: 1 % (ref 0–2)
BILIRUB SERPL-MCNC: 0.2 MG/DL (ref 0.2–1)
BILIRUB UR QL: NEGATIVE
BNP SERPL-MCNC: 392 PG/ML (ref 0–1800)
BUN SERPL-MCNC: 18 MG/DL (ref 7–18)
BUN/CREAT SERPL: 19 (ref 12–20)
CALCIUM SERPL-MCNC: 8.4 MG/DL (ref 8.5–10.1)
CHLORIDE SERPL-SCNC: 113 MMOL/L (ref 100–111)
CK MB CFR SERPL CALC: 1.3 % (ref 0–4)
CK MB SERPL-MCNC: 1.6 NG/ML (ref 5–25)
CK SERPL-CCNC: 121 U/L (ref 26–192)
CO2 SERPL-SCNC: 23 MMOL/L (ref 21–32)
COLOR UR: YELLOW
CREAT SERPL-MCNC: 0.95 MG/DL (ref 0.6–1.3)
DIFFERENTIAL METHOD BLD: ABNORMAL
EOSINOPHIL # BLD: 0.3 K/UL (ref 0–0.4)
EOSINOPHIL NFR BLD: 5 % (ref 0–5)
EPITH CASTS URNS QL MICRO: NORMAL /LPF (ref 0–5)
ERYTHROCYTE [DISTWIDTH] IN BLOOD BY AUTOMATED COUNT: 14.2 % (ref 11.6–14.5)
GLOBULIN SER CALC-MCNC: 4 G/DL (ref 2–4)
GLUCOSE BLD STRIP.AUTO-MCNC: 129 MG/DL (ref 70–110)
GLUCOSE SERPL-MCNC: 106 MG/DL (ref 74–99)
GLUCOSE UR STRIP.AUTO-MCNC: NEGATIVE MG/DL
HCT VFR BLD AUTO: 38.1 % (ref 35–45)
HGB BLD-MCNC: 12.3 G/DL (ref 12–16)
HGB UR QL STRIP: NEGATIVE
KETONES UR QL STRIP.AUTO: NEGATIVE MG/DL
LEUKOCYTE ESTERASE UR QL STRIP.AUTO: ABNORMAL
LYMPHOCYTES # BLD: 1.7 K/UL (ref 0.9–3.6)
LYMPHOCYTES NFR BLD: 23 % (ref 21–52)
MAGNESIUM SERPL-MCNC: 2.2 MG/DL (ref 1.6–2.6)
MCH RBC QN AUTO: 29.7 PG (ref 24–34)
MCHC RBC AUTO-ENTMCNC: 32.3 G/DL (ref 31–37)
MCV RBC AUTO: 92 FL (ref 74–97)
MONOCYTES # BLD: 0.7 K/UL (ref 0.05–1.2)
MONOCYTES NFR BLD: 10 % (ref 3–10)
NEUTS SEG # BLD: 4.5 K/UL (ref 1.8–8)
NEUTS SEG NFR BLD: 61 % (ref 40–73)
NITRITE UR QL STRIP.AUTO: NEGATIVE
PH UR STRIP: 6.5 [PH] (ref 5–8)
PLATELET # BLD AUTO: 244 K/UL (ref 135–420)
PMV BLD AUTO: 11.2 FL (ref 9.2–11.8)
POTASSIUM SERPL-SCNC: 3.9 MMOL/L (ref 3.5–5.5)
PROT SERPL-MCNC: 7.7 G/DL (ref 6.4–8.2)
PROT UR STRIP-MCNC: NEGATIVE MG/DL
RBC # BLD AUTO: 4.14 M/UL (ref 4.2–5.3)
RBC #/AREA URNS HPF: NEGATIVE /HPF (ref 0–5)
SODIUM SERPL-SCNC: 144 MMOL/L (ref 136–145)
SP GR UR REFRACTOMETRY: 1.02 (ref 1–1.03)
TROPONIN I SERPL-MCNC: <0.02 NG/ML (ref 0–0.04)
UROBILINOGEN UR QL STRIP.AUTO: 0.2 EU/DL (ref 0.2–1)
WBC # BLD AUTO: 7.4 K/UL (ref 4.6–13.2)
WBC URNS QL MICRO: NORMAL /HPF (ref 0–5)

## 2020-03-08 PROCEDURE — 82962 GLUCOSE BLOOD TEST: CPT

## 2020-03-08 PROCEDURE — 85025 COMPLETE CBC W/AUTO DIFF WBC: CPT

## 2020-03-08 PROCEDURE — 83880 ASSAY OF NATRIURETIC PEPTIDE: CPT

## 2020-03-08 PROCEDURE — 80053 COMPREHEN METABOLIC PANEL: CPT

## 2020-03-08 PROCEDURE — 70450 CT HEAD/BRAIN W/O DYE: CPT

## 2020-03-08 PROCEDURE — 99285 EMERGENCY DEPT VISIT HI MDM: CPT

## 2020-03-08 PROCEDURE — 71045 X-RAY EXAM CHEST 1 VIEW: CPT

## 2020-03-08 PROCEDURE — 81001 URINALYSIS AUTO W/SCOPE: CPT

## 2020-03-08 PROCEDURE — 93005 ELECTROCARDIOGRAM TRACING: CPT

## 2020-03-08 PROCEDURE — 82550 ASSAY OF CK (CPK): CPT

## 2020-03-08 PROCEDURE — 83735 ASSAY OF MAGNESIUM: CPT

## 2020-03-08 PROCEDURE — 74011250636 HC RX REV CODE- 250/636: Performed by: EMERGENCY MEDICINE

## 2020-03-08 PROCEDURE — 96374 THER/PROPH/DIAG INJ IV PUSH: CPT

## 2020-03-08 PROCEDURE — 74011250637 HC RX REV CODE- 250/637: Performed by: EMERGENCY MEDICINE

## 2020-03-08 RX ORDER — ASPIRIN 325 MG
325 TABLET ORAL
Status: COMPLETED | OUTPATIENT
Start: 2020-03-08 | End: 2020-03-08

## 2020-03-08 RX ORDER — ACETAMINOPHEN 500 MG
1000 TABLET ORAL ONCE
Status: COMPLETED | OUTPATIENT
Start: 2020-03-08 | End: 2020-03-08

## 2020-03-08 RX ORDER — ONDANSETRON 2 MG/ML
4 INJECTION INTRAMUSCULAR; INTRAVENOUS
Status: COMPLETED | OUTPATIENT
Start: 2020-03-08 | End: 2020-03-08

## 2020-03-08 RX ORDER — TRAMADOL HYDROCHLORIDE 50 MG/1
50 TABLET ORAL
Status: DISCONTINUED | OUTPATIENT
Start: 2020-03-08 | End: 2020-03-09 | Stop reason: HOSPADM

## 2020-03-08 RX ADMIN — ONDANSETRON 4 MG: 2 INJECTION INTRAMUSCULAR; INTRAVENOUS at 22:24

## 2020-03-08 RX ADMIN — ASPIRIN 325 MG ORAL TABLET 325 MG: 325 PILL ORAL at 22:23

## 2020-03-08 RX ADMIN — ACETAMINOPHEN 1000 MG: 500 TABLET ORAL at 22:23

## 2020-03-08 NOTE — ED NOTES
Bedside and Verbal shift change report given to [id number] (oncoming nurse) by Qamar Dill RN (offgoing nurse). Report included the following information SBAR, ED Summary, Procedure Summary, Intake/Output, MAR, Recent Results and Med Rec Status.

## 2020-03-08 NOTE — ED TRIAGE NOTES
Arrives to ED via EMS with c/o bilateral LE weakness and confusion while at [facility] appr 30 min PTA. Pt with pmnhx of alzheimers. Pt resides at [facility].

## 2020-03-08 NOTE — ED NOTES
Assumed c/o pt. Reports taken verbally and bedside. In to obtain labs. Pt assisted with bedpan. Then xray. CT.  To obtain labs on return to ED

## 2020-03-08 NOTE — ED PROVIDER NOTES
EMERGENCY DEPARTMENT HISTORY AND PHYSICAL EXAM    Date: 3/8/2020  Patient Name: Kelvin Harry    History of Presenting Illness     Chief Complaint   Patient presents with    Extremity Weakness    Altered mental status         History Provided By: Patient, Patient's  and Patient's Daughter    Mai Yoon is a 78 y.o. female with PMHX of dementia, paroxysmal A. Fib, CHF who presents to the emergency department C/O of change in mental status. Patient's daughter and significant other report that patient had a transient change in mental status while at [employer] this evening around 5:45 PM.  Patient said she did not feel well and was having pain from her head to her toe. She was \"numb \"in her left leg and seemed to be weak in her left lower extremity. She also had ashen appearance with flushing of her face and was speaking in low voices. Patient nodded off a few times but woke up to voice each time. In emergency department patient says she has no symptoms. Says her breathing is okay. She denies pain to me. Patient is an unreliable historian and does not contribute much to history. PCP: Omar Martínez MD    Current Facility-Administered Medications   Medication Dose Route Frequency Provider Last Rate Last Dose    traMADoL (ULTRAM) tablet 50 mg  50 mg Oral NOW Sharee Fenton MD         Current Outpatient Medications   Medication Sig Dispense Refill    LORazepam (ATIVAN) 0.5 mg tablet Take 1 Tab by mouth every eight (8) hours as needed for Anxiety. Max Daily Amount: 1.5 mg. 20 Tab 0    dilTIAZem CD (CARDIZEM CD) 120 mg ER capsule Take 1 Cap by mouth daily. 30 Cap 0    hydroxychloroquine (PLAQUENIL) 200 mg tablet Take 1 Tab by mouth daily (with breakfast). 30 Tab 0    potassium chloride (KLOR-CON) 10 mEq tablet Take 1 Tab by mouth daily. 30 Tab 0    sertraline (ZOLOFT) 50 mg tablet Take 1 Tab by mouth daily.  30 Tab 0    calcium carbonate (OS-PATRICIA) 500 mg calcium (1,250 mg) tablet Take 30 Tabs by mouth daily. 30 Tab 0    multivitamin capsule Take 1 Cap by mouth daily. 30 Cap 0    donepezil (ARICEPT) 10 mg tablet Take 1 Tab by mouth nightly. 30 Tab 0    potassium chloride SR (KLOR-CON 10) 10 mEq tablet Take 10 mEq by mouth daily.  amLODIPine (NORVASC) 5 mg tablet Take 5 mg by mouth daily.  hydroCHLOROthiazide (HYDRODIURIL) 12.5 mg tablet Take 12.5 mg by mouth daily.  pantoprazole (PROTONIX) 40 mg tablet Take 40 mg by mouth daily. Past History     Past Medical History:  Past Medical History:   Diagnosis Date    [facility]     CHF [facility]congestive heart failure) [facility]     Dementia ([facility]     Menopause        Past Surgical History:  Past Surgical History:   Procedure Laterality Date    HX HYSTERECTOMY      Age 32       Family History:  Family History   Problem Relation Age of Onset    Breast Cancer Sister        Social History:  Social History     Tobacco Use    Smoking status: Never Smoker    Smokeless tobacco: Never Used   Substance Use Topics    Alcohol use: Not Currently    Drug use: Never       Allergies:  No Known Allergies      Review of Systems   Review of Systems   Unable to perform ROS: Dementia   Respiratory: Negative for shortness of breath. Cardiovascular: Negative for chest pain. Neurological: Negative for weakness and headaches. Physical Exam     Vitals:    03/08/20 1856   BP: (!) 162/104   Pulse: 98   Resp: 13   Temp: 98.1 °F (36.7 °C)   SpO2: 99%   Weight: 73.9 kg (162 lb 14.4 oz)   Height: 5' 7\" (1.702 m)     Physical Exam  Vitals signs reviewed. Constitutional:       General: She is not in acute distress. Appearance: She is well-developed. She is not ill-appearing. HENT:      Head: Normocephalic and atraumatic. Eyes:      General: No visual field deficit. Extraocular Movements: Extraocular movements intact. Pupils: Pupils are equal, round, and reactive to light.    Neck:      Musculoskeletal: Full passive range of motion without pain, normal range of motion and neck supple. Vascular: No JVD. Cardiovascular:      Rate and Rhythm: Normal rate and regular rhythm. Pulses: Normal pulses. Heart sounds: Normal heart sounds, S1 normal and S2 normal. No murmur. Pulmonary:      Effort: Pulmonary effort is normal.      Breath sounds: Normal breath sounds and air entry. No decreased breath sounds or wheezing. Lymphadenopathy:      Cervical: No cervical adenopathy. Neurological:      Mental Status: She is alert. Mental status is at baseline. Cranial Nerves: Cranial nerves are intact. No cranial nerve deficit, dysarthria or facial asymmetry. Sensory: Sensation is intact. No sensory deficit. Motor: Motor function is intact. No weakness, abnormal muscle tone or pronator drift.       Comments: Alert and oriented to name, place \"hospital \", and year 2020 Does not recall president or situtiation            Diagnostic Study Results     Labs -     Recent Results (from the past 12 hour(s))   EKG, 12 LEAD, INITIAL    Collection Time: 03/08/20  6:58 PM   Result Value Ref Range    Ventricular Rate 98 BPM    Atrial Rate 98 BPM    P-R Interval 166 ms    QRS Duration 90 ms    Q-T Interval 396 ms    QTC Calculation (Bezet) 505 ms    Calculated P Axis 41 degrees    Calculated R Axis -44 degrees    Calculated T Axis 58 degrees    Diagnosis       Normal sinus rhythm  Left axis deviation  Pulmonary disease pattern  RSR' or QR pattern in V1 suggests right ventricular conduction delay  Minimal voltage criteria for LVH, may be normal variant  Nonspecific ST and T wave abnormality  Prolonged QT  Abnormal ECG  When compared with ECG of 15-SEP-2018 10:52,  RSR' pattern in V1 is now present     GLUCOSE, POC    Collection Time: 03/08/20  7:03 PM   Result Value Ref Range    Glucose (POC) 129 (H) 70 - 110 mg/dL   URINALYSIS W/ RFLX MICROSCOPIC    Collection Time: 03/08/20  7:23 PM   Result Value Ref Range    Color YELLOW      Appearance CLEAR      Specific gravity 1.016 1.005 - 1.030      pH (UA) 6.5 5.0 - 8.0      Protein NEGATIVE  NEG mg/dL    Glucose NEGATIVE  NEG mg/dL    Ketone NEGATIVE  NEG mg/dL    Bilirubin NEGATIVE  NEG      Blood NEGATIVE  NEG      Urobilinogen 0.2 0.2 - 1.0 EU/dL    Nitrites NEGATIVE  NEG      Leukocyte Esterase MODERATE (A) NEG     URINE MICROSCOPIC ONLY    Collection Time: 03/08/20  7:23 PM   Result Value Ref Range    WBC 4 to 10 0 - 5 /hpf    RBC NEGATIVE  0 - 5 /hpf    Epithelial cells 0 to 3 0 - 5 /lpf    Bacteria NEGATIVE  NEG /hpf   METABOLIC PANEL, COMPREHENSIVE    Collection Time: 03/08/20  8:14 PM   Result Value Ref Range    Sodium 144 136 - 145 mmol/L    Potassium 3.9 3.5 - 5.5 mmol/L    Chloride 113 (H) 100 - 111 mmol/L    CO2 23 21 - 32 mmol/L    Anion gap 8 3.0 - 18 mmol/L    Glucose 106 (H) 74 - 99 mg/dL    BUN 18 7.0 - 18 MG/DL    Creatinine 0.95 0.6 - 1.3 MG/DL    BUN/Creatinine ratio 19 12 - 20      GFR est AA >60 >60 ml/min/1.73m2    GFR est non-AA 57 (L) >60 ml/min/1.73m2    Calcium 8.4 (L) 8.5 - 10.1 MG/DL    Bilirubin, total 0.2 0.2 - 1.0 MG/DL    ALT (SGPT) 19 13 - 56 U/L    AST (SGOT) 21 10 - 38 U/L    Alk. phosphatase 129 (H) 45 - 117 U/L    Protein, total 7.7 6.4 - 8.2 g/dL    Albumin 3.7 3.4 - 5.0 g/dL    Globulin 4.0 2.0 - 4.0 g/dL    A-G Ratio 0.9 0.8 - 1.7     CBC WITH AUTOMATED DIFF    Collection Time: 03/08/20  8:14 PM   Result Value Ref Range    WBC 7.4 4.6 - 13.2 K/uL    RBC 4.14 (L) 4.20 - 5.30 M/uL    HGB 12.3 12.0 - 16.0 g/dL    HCT 38.1 35.0 - 45.0 %    MCV 92.0 74.0 - 97.0 FL    MCH 29.7 24.0 - 34.0 PG    MCHC 32.3 31.0 - 37.0 g/dL    RDW 14.2 11.6 - 14.5 %    PLATELET 401 597 - 937 K/uL    MPV 11.2 9.2 - 11.8 FL    NEUTROPHILS 61 40 - 73 %    LYMPHOCYTES 23 21 - 52 %    MONOCYTES 10 3 - 10 %    EOSINOPHILS 5 0 - 5 %    BASOPHILS 1 0 - 2 %    ABS. NEUTROPHILS 4.5 1.8 - 8.0 K/UL    ABS. LYMPHOCYTES 1.7 0.9 - 3.6 K/UL    ABS.  MONOCYTES 0.7 0.05 - 1.2 K/UL ABS. EOSINOPHILS 0.3 0.0 - 0.4 K/UL    ABS. BASOPHILS 0.1 0.0 - 0.1 K/UL    DF AUTOMATED     CARDIAC PANEL,(CK, CKMB & TROPONIN)    Collection Time: 03/08/20  8:14 PM   Result Value Ref Range     26 - 192 U/L    CK - MB 1.6 <3.6 ng/ml    CK-MB Index 1.3 0.0 - 4.0 %    Troponin-I, QT <0.02 0.0 - 0.045 NG/ML   NT-PRO BNP    Collection Time: 03/08/20  8:14 PM   Result Value Ref Range    NT pro- 0 - 1,800 PG/ML   MAGNESIUM    Collection Time: 03/08/20  8:14 PM   Result Value Ref Range    Magnesium 2.2 1.6 - 2.6 mg/dL       Radiologic Studies -   CT HEAD WO CONT   Final Result   Addendum 1 of 1   Addendum: Addendum to impression      Chronic paranasal sinus disease with polyp in the retention cyst the left   maxillary sinus. Dr. Omid Still 8:46 PM March 8, 2020. Final      XR CHEST PORT   Final Result   IMPRESSION:  No acute process      MRI BRAIN WO CONT    (Results Pending)     CT Results  (Last 48 hours)    None        CXR Results  (Last 48 hours)               03/08/20 1935  XR CHEST PORT Final result    Impression:  IMPRESSION:  No acute process       Narrative:  EXAM:  AP Portable Chest X-ray 1 view        INDICATION: Bilateral lower extremity weakness       COMPARISON: None       _______________       FINDINGS:  Heart size is enlarged partly due to AP magnification and mediastinal   contours are within normal limits for portable radiograph. Lungs are clear of   active disease. There is left lung base atelectasis scarring. There are no   pleural effusions. No acute osseous findings.        ________________                     Medications given in the ED-  Medications   traMADoL (ULTRAM) tablet 50 mg (has no administration in time range)   aspirin tablet 325 mg (325 mg Oral Given 3/8/20 2223)   ondansetron (ZOFRAN) injection 4 mg (4 mg IntraVENous Given 3/8/20 2224)   acetaminophen (TYLENOL) tablet 1,000 mg (1,000 mg Oral Given 3/8/20 2223)         Medical Decision Making   I am the first provider for this patient. I reviewed the vital signs, available nursing notes, past medical history, past surgical history, family history and social history. Vital Signs-Reviewed the patient's vital signs. Pulse Oximetry Analysis - 99% on RA     Cardiac Monitor:  Rate: 98 bpm  Rhythm: NSR    EKG interpretation: (Preliminary)  EKG read by Dr. Osei Martell at 7451   Normal sinus rhythm with left axis deviation, , no ST elevation,    Records Reviewed: Nursing Notes and Old Medical Records    Provider Notes (Medical Decision Making): Bandar Falcon is a 78 y.o. female aunts with transient change in mental status primarily concerned would be ACS versus TIA. Stroke alert not called as patient has no neurological deficits at this time and is at her baseline status therefore would not be a TPA candidate. Procedures:  Procedures    ED Course:   CONSULT NOTE:   I discussed care with Dr María Dougherty It was a standard discussion, including history of patients chief complaint, available diagnostic results, and treatment course. Discussed case. Recommends MRI of head tomorrow morning and patient to be admitted for TIA/stroke work-up    11:14 PM  Patient's daughter is requesting discharge. States that she does not believe that her mom would be able to sit still or tolerate MRI. Additionally I discussed that I do have some concern for ACS given her earlier symptoms however they were unable to draw blood from her current line for second set of cardiac enzymes and she did not wish for any further IV sticks. Discussed that we have not fully ruled out TIA or stroke or ACS for that matter but they are comfortable with being discharged and have follow-up with her primary care provider tomorrow      Diagnosis and Disposition     Critical Care:     DISCHARGE NOTE:    Reeves Lennox Parker's  results have been reviewed with her. She has been counseled regarding her diagnosis, treatment, and plan.   She verbally conveys understanding and agreement of the signs, symptoms, diagnosis, treatment and prognosis and additionally agrees to follow up as discussed. She also agrees with the care-plan and conveys that all of her questions have been answered. I have also provided discharge instructions for her that include: educational information regarding their diagnosis and treatment, and list of reasons why they would want to return to the ED prior to their follow-up appointment, should her condition change. She has been provided with education for proper emergency department utilization. CLINICAL IMPRESSION:    1. TIA (transient ischemic attack)    2. Syncope and collapse        PLAN:  1. D/C Home  2. Current Discharge Medication List        3. Follow-up Information     Follow up With Specialties Details Why Contact Info    Hortencia Simpson MD Internal Medicine Go in 1 day For primary care follow up [facility]  [phone]          _______________________________      Please note that this dictation was completed with [employer], the computer voice recognition software. Quite often unanticipated grammatical, syntax, homophones, and other interpretive errors are inadvertently transcribed by the computer software. Please disregard these errors. Please excuse any errors that have escaped final proofreading.

## 2020-03-09 LAB
ATRIAL RATE: 98 BPM
CALCULATED P AXIS, ECG09: 41 DEGREES
CALCULATED R AXIS, ECG10: -44 DEGREES
CALCULATED T AXIS, ECG11: 58 DEGREES
DIAGNOSIS, 93000: NORMAL
P-R INTERVAL, ECG05: 166 MS
Q-T INTERVAL, ECG07: 396 MS
QRS DURATION, ECG06: 90 MS
QTC CALCULATION (BEZET), ECG08: 505 MS
VENTRICULAR RATE, ECG03: 98 BPM

## 2020-03-09 NOTE — ED NOTES
Pt assisted with using a bedpan multiple times by staff and by her daughter. Pt extremely anxious and often refusing to follow commands.  Daughter assisting with tasks as needed

## 2020-03-09 NOTE — ED NOTES
Unable to draw labs from her current SL. Daughter and pt refuse to be stuck again. Demanding DC of pt home to her facility and they will transport her.  MD msith

## 2020-03-09 NOTE — ED NOTES
Pt taken to BR in w/c, returned to room with RN refusing to sit in w/c. Ambulatory with a ready gait. Stopping at each room en route demanding to go in to [facility]\". Able to get back to room then refused to get into the bed. Resting in a chair in the room. Daughter was able to assist pt to bed. Then pt c/o chronic back pain. MD made aware. Awaiting orders. Shortly after this event, pt vomiting in bed. MD aware and returned to bedside for update with family.  To be medicated per orders

## 2020-03-09 NOTE — ED NOTES
Pt home ambulatory. To f/u as scheduled tomorrow. Daughter and other family member to transport pt back to her facility. Steady gait with standby asistance  Patient armband removed and shredded  I have reviewed discharge instructions with the guardian/caregiver. The caregiver and guardian verbalized understanding. Discharge medications reviewed with guardian and caregiver and appropriate educational materials and side effects teaching were provided.   Follow-up Information     Follow up With Specialties Details Why Contact Info    Stacey Rice MD Internal Medicine Go in 1 day For primary care follow up [id number] [facility]  [phone]

## 2020-03-09 NOTE — ED NOTES
Pt remains uncooperative with BP cuff. Difficult to redirect. Difficult to obtain BP even with daughters assistance    Daughter out to speak with this RN. Stating her mother will not sit through an MRI. She wants her D/c'd back to her facility. Stating she has a f/u with her PCP tomorrow. DR Ronan Yoder made aware.      Repeat cardiac enzymes to be drawn and sent then pt to be D/c'd

## 2022-03-18 PROBLEM — I10 HTN (HYPERTENSION): Status: ACTIVE | Noted: 2017-11-08

## 2022-03-18 PROBLEM — F03.90 DEMENTIA (HCC): Status: ACTIVE | Noted: 2017-11-08

## 2022-03-19 PROBLEM — I48.91 ATRIAL FIBRILLATION WITH RVR (HCC): Status: ACTIVE | Noted: 2017-11-08

## 2022-03-20 PROBLEM — R62.7 FAILURE TO THRIVE IN ADULT: Status: ACTIVE | Noted: 2017-11-08

## 2024-04-09 ENCOUNTER — HOSPITAL ENCOUNTER (EMERGENCY)
Facility: HOSPITAL | Age: 84
Discharge: HOME | End: 2024-04-10
Attending: STUDENT IN AN ORGANIZED HEALTH CARE EDUCATION/TRAINING PROGRAM
Payer: MEDICARE

## 2024-04-09 ENCOUNTER — HOSPITAL ENCOUNTER (EMERGENCY)
Facility: HOSPITAL | Age: 84
Discharge: HOME | End: 2024-04-12
Payer: MEDICARE

## 2024-04-09 VITALS
DIASTOLIC BLOOD PRESSURE: 60 MMHG | RESPIRATION RATE: 17 BRPM | WEIGHT: 120 LBS | OXYGEN SATURATION: 99 % | TEMPERATURE: 98 F | SYSTOLIC BLOOD PRESSURE: 147 MMHG | HEIGHT: 65 IN | HEART RATE: 60 BPM | BODY MASS INDEX: 19.99 KG/M2

## 2024-04-09 DIAGNOSIS — S09.90XA INJURY OF HEAD, INITIAL ENCOUNTER: Primary | ICD-10-CM

## 2024-04-09 DIAGNOSIS — S01.01XA LACERATION OF SCALP, INITIAL ENCOUNTER: ICD-10-CM

## 2024-04-09 PROCEDURE — 2580000003 HC RX 258

## 2024-04-09 PROCEDURE — 6370000000 HC RX 637 (ALT 250 FOR IP): Performed by: STUDENT IN AN ORGANIZED HEALTH CARE EDUCATION/TRAINING PROGRAM

## 2024-04-09 PROCEDURE — 72125 CT NECK SPINE W/O DYE: CPT

## 2024-04-09 PROCEDURE — 6360000002 HC RX W HCPCS: Performed by: STUDENT IN AN ORGANIZED HEALTH CARE EDUCATION/TRAINING PROGRAM

## 2024-04-09 PROCEDURE — 12001 RPR S/N/AX/GEN/TRNK 2.5CM/<: CPT

## 2024-04-09 PROCEDURE — 99284 EMERGENCY DEPT VISIT MOD MDM: CPT

## 2024-04-09 PROCEDURE — 96372 THER/PROPH/DIAG INJ SC/IM: CPT

## 2024-04-09 PROCEDURE — 70450 CT HEAD/BRAIN W/O DYE: CPT

## 2024-04-09 RX ORDER — OLANZAPINE 10 MG/2ML
5 INJECTION, POWDER, FOR SOLUTION INTRAMUSCULAR ONCE
Status: COMPLETED | OUTPATIENT
Start: 2024-04-09 | End: 2024-04-09

## 2024-04-09 RX ORDER — ACETAMINOPHEN 500 MG
500 TABLET ORAL 4 TIMES DAILY PRN
Qty: 56 TABLET | Refills: 0 | Status: SHIPPED | OUTPATIENT
Start: 2024-04-09 | End: 2024-04-23

## 2024-04-09 RX ORDER — WATER 10 ML/10ML
INJECTION INTRAMUSCULAR; INTRAVENOUS; SUBCUTANEOUS
Status: COMPLETED
Start: 2024-04-09 | End: 2024-04-09

## 2024-04-09 RX ORDER — ONDANSETRON 4 MG/1
4 TABLET, ORALLY DISINTEGRATING ORAL
Status: COMPLETED | OUTPATIENT
Start: 2024-04-09 | End: 2024-04-09

## 2024-04-09 RX ADMIN — WATER 10 ML: 1 INJECTION INTRAMUSCULAR; INTRAVENOUS; SUBCUTANEOUS at 21:27

## 2024-04-09 RX ADMIN — ONDANSETRON 4 MG: 4 TABLET, ORALLY DISINTEGRATING ORAL at 21:28

## 2024-04-09 RX ADMIN — OLANZAPINE 5 MG: 10 INJECTION, POWDER, FOR SOLUTION INTRAMUSCULAR at 21:27

## 2024-04-10 NOTE — DISCHARGE INSTRUCTIONS
You were evaluated for fall and laceration to the head .  Based on your work-up it was deemed that she was stable for discharge.  Please take Tylenol every 4-6 hours as needed for pain.  Please follow-up with your primary care physician if you have any further concerns and go over your work-up.  If you experience any chest pain, shortness of breath, worsening abdominal pain, vomiting blood, worsening headache, seizures, or any worsening of your symptoms please return to the emergency department immediately.  If you have any pending results or any further questions please contact the emergency department at [phone]

## 2024-04-10 NOTE — ED PROVIDER NOTES
EMERGENCY DEPARTMENT HISTORY AND PHYSICAL EXAM    5:27 PM      Date: 4/9/2024  Patient Name: Taylor Gupta    History of Presenting Illness     Chief Complaint   Patient presents with    Fall     Pt presents c/o fall that occurred around 30 min PTA. Pt is from [facility] from [place]; pt had witnessed fall, mo LOC reported or use of blood thinners.        History From: Patient, EMS, and Patient's Daughter    Taylor Gupta is a 83 y.o. female   HPI  83-year-old female with history of dementia, hypertension, A-fib not on anticoagulation, peptic ulcer disease who presents with fall.  As per EMS patient she sustained a mechanical fall.  Witnessed by facility staff.  No LOC.  Patient is not on any anticoagulation.  They deny any other trauma.  Patient says the pain is 10 out of 10, right occipital region, throbbing.  When assessing ROS she denies any nausea, vomiting, chest pain, shortness of breath, focal with no earlier other changes.      Nursing Notes were all reviewed and agreed with or any disagreements were addressed in the HPI.    PCP: Mark Tubbs, DO    No current facility-administered medications for this encounter.     Current Outpatient Medications   Medication Sig Dispense Refill    acetaminophen (TYLENOL) 500 MG tablet Take 1 tablet by mouth 4 times daily as needed for Pain 56 tablet 0    amLODIPine (NORVASC) 5 MG tablet Take 5 mg by mouth daily      calcium carbonate (OYSTER SHELL CALCIUM 500 MG) 1250 (500 Ca) MG tablet Take 15,000 mg by mouth daily      dilTIAZem (TIAZAC) 120 MG extended release capsule Take 120 mg by mouth daily      donepezil (ARICEPT) 10 MG tablet Take 10 mg by mouth      hydroCHLOROthiazide (HYDRODIURIL) 12.5 MG tablet Take 12.5 mg by mouth daily      hydroxychloroquine (PLAQUENIL) 200 MG tablet Take 200 mg by mouth daily (with breakfast)      LORazepam (ATIVAN) 0.5 MG tablet Take 0.5 mg by mouth every 8 hours as needed.      pantoprazole (PROTONIX) 40 MG tablet Take

## 2024-04-10 NOTE — ED NOTES
Daughter refusing bloodwork and EKG. No aggressive treatment. MD aware.No new orders received. Patient DNR.

## 2024-04-10 NOTE — ED NOTES
Pt presents c/o fall that occurred around 30 min PTA. [facility] from [place]; pt had witnessed fall, mo LOC reported or use of blood thinners.

## 2024-04-11 NOTE — PROCEDURES
Lac Repair    Date/Time: 4/11/2024 5:27 PM    Performed by: Elías Joy MD  Authorized by: Elías Joy MD    Consent:     Consent obtained:  Verbal    Consent given by:  Guardian    Risks discussed:  Infection and pain    Alternatives discussed:  No treatment  Universal protocol:     Patient identity confirmed:  Verbally with patient and hospital-assigned identification number  Anesthesia:     Anesthesia method:  None  Laceration details:     Location:  Scalp    Scalp location:  Occipital    Length (cm):  2  Pre-procedure details:     Preparation:  Patient was prepped and draped in usual sterile fashion  Treatment:     Area cleansed with:  Saline    Amount of cleaning:  Standard    Irrigation solution:  Sterile saline    Irrigation method:  Syringe  Skin repair:     Repair method:  Staples    Number of staples:  1  Approximation:     Approximation:  Close  Repair type:     Repair type:  Simple  Post-procedure details:     Dressing:  Open (no dressing)    Procedure completion:  Tolerated well, no immediate complications